# Patient Record
Sex: MALE | Race: WHITE | Employment: OTHER | URBAN - METROPOLITAN AREA
[De-identification: names, ages, dates, MRNs, and addresses within clinical notes are randomized per-mention and may not be internally consistent; named-entity substitution may affect disease eponyms.]

---

## 2017-01-09 ENCOUNTER — TELEPHONE (OUTPATIENT)
Dept: FAMILY MEDICINE CLINIC | Age: 67
End: 2017-01-09

## 2017-04-21 ENCOUNTER — OFFICE VISIT (OUTPATIENT)
Dept: BARIATRICS/WEIGHT MGMT | Age: 67
End: 2017-04-21

## 2017-04-21 VITALS
RESPIRATION RATE: 18 BRPM | WEIGHT: 300.6 LBS | DIASTOLIC BLOOD PRESSURE: 81 MMHG | TEMPERATURE: 98.7 F | HEIGHT: 70 IN | SYSTOLIC BLOOD PRESSURE: 159 MMHG | BODY MASS INDEX: 43.03 KG/M2 | HEART RATE: 83 BPM

## 2017-04-21 DIAGNOSIS — I10 ESSENTIAL HYPERTENSION: ICD-10-CM

## 2017-04-21 DIAGNOSIS — E66.9 OBESITY, UNSPECIFIED OBESITY SEVERITY, UNSPECIFIED OBESITY TYPE: Primary | ICD-10-CM

## 2017-04-21 DIAGNOSIS — E66.01 MORBID OBESITY DUE TO EXCESS CALORIES (HCC): Primary | ICD-10-CM

## 2017-04-21 DIAGNOSIS — E11.9 TYPE 2 DIABETES MELLITUS WITHOUT COMPLICATION, UNSPECIFIED LONG TERM INSULIN USE STATUS: ICD-10-CM

## 2017-04-21 DIAGNOSIS — E78.00 HYPERCHOLESTEREMIA: ICD-10-CM

## 2017-04-21 PROCEDURE — 99214 OFFICE O/P EST MOD 30 MIN: CPT | Performed by: SURGERY

## 2017-04-21 ASSESSMENT — ENCOUNTER SYMPTOMS
SHORTNESS OF BREATH: 0
EYES NEGATIVE: 1
APNEA: 1
GASTROINTESTINAL NEGATIVE: 1
ALLERGIC/IMMUNOLOGIC NEGATIVE: 1

## 2017-04-25 ENCOUNTER — OFFICE VISIT (OUTPATIENT)
Dept: FAMILY MEDICINE CLINIC | Age: 67
End: 2017-04-25

## 2017-04-25 VITALS
OXYGEN SATURATION: 96 % | SYSTOLIC BLOOD PRESSURE: 122 MMHG | RESPIRATION RATE: 20 BRPM | BODY MASS INDEX: 45.92 KG/M2 | DIASTOLIC BLOOD PRESSURE: 84 MMHG | HEART RATE: 86 BPM | WEIGHT: 303 LBS | HEIGHT: 68 IN

## 2017-04-25 DIAGNOSIS — I25.10 CORONARY ARTERY DISEASE INVOLVING NATIVE CORONARY ARTERY OF NATIVE HEART WITHOUT ANGINA PECTORIS: ICD-10-CM

## 2017-04-25 DIAGNOSIS — E78.5 HYPERLIPIDEMIA, UNSPECIFIED HYPERLIPIDEMIA TYPE: ICD-10-CM

## 2017-04-25 DIAGNOSIS — I10 ESSENTIAL HYPERTENSION: ICD-10-CM

## 2017-04-25 DIAGNOSIS — G47.33 OBSTRUCTIVE SLEEP APNEA ON CPAP: ICD-10-CM

## 2017-04-25 DIAGNOSIS — N40.1 BENIGN NON-NODULAR PROSTATIC HYPERPLASIA WITH LOWER URINARY TRACT SYMPTOMS: ICD-10-CM

## 2017-04-25 DIAGNOSIS — Z12.5 SCREENING FOR PROSTATE CANCER: ICD-10-CM

## 2017-04-25 DIAGNOSIS — E66.01 MORBID OBESITY, UNSPECIFIED OBESITY TYPE (HCC): ICD-10-CM

## 2017-04-25 DIAGNOSIS — Z99.89 OBSTRUCTIVE SLEEP APNEA ON CPAP: ICD-10-CM

## 2017-04-25 PROCEDURE — 3045F PR MOST RECENT HEMOGLOBIN A1C LEVEL 7.0-9.0%: CPT | Performed by: FAMILY MEDICINE

## 2017-04-25 PROCEDURE — G8417 CALC BMI ABV UP PARAM F/U: HCPCS | Performed by: FAMILY MEDICINE

## 2017-04-25 PROCEDURE — 3017F COLORECTAL CA SCREEN DOC REV: CPT | Performed by: FAMILY MEDICINE

## 2017-04-25 PROCEDURE — 4040F PNEUMOC VAC/ADMIN/RCVD: CPT | Performed by: FAMILY MEDICINE

## 2017-04-25 PROCEDURE — 1123F ACP DISCUSS/DSCN MKR DOCD: CPT | Performed by: FAMILY MEDICINE

## 2017-04-25 PROCEDURE — G8427 DOCREV CUR MEDS BY ELIG CLIN: HCPCS | Performed by: FAMILY MEDICINE

## 2017-04-25 PROCEDURE — 1036F TOBACCO NON-USER: CPT | Performed by: FAMILY MEDICINE

## 2017-04-25 PROCEDURE — 99214 OFFICE O/P EST MOD 30 MIN: CPT | Performed by: FAMILY MEDICINE

## 2017-04-25 PROCEDURE — G8598 ASA/ANTIPLAT THER USED: HCPCS | Performed by: FAMILY MEDICINE

## 2017-04-25 RX ORDER — VALSARTAN AND HYDROCHLOROTHIAZIDE 160; 25 MG/1; MG/1
1 TABLET ORAL DAILY
Qty: 90 TABLET | Refills: 3 | Status: SHIPPED | OUTPATIENT
Start: 2017-04-25 | End: 2019-06-12

## 2017-04-25 RX ORDER — CLOPIDOGREL BISULFATE 75 MG/1
75 TABLET ORAL DAILY
Qty: 90 TABLET | Refills: 3 | Status: SHIPPED | OUTPATIENT
Start: 2017-04-25 | End: 2020-09-01

## 2017-04-25 RX ORDER — NITROGLYCERIN 0.4 MG/1
0.4 TABLET SUBLINGUAL EVERY 5 MIN PRN
Qty: 25 TABLET | Refills: 0 | Status: SHIPPED | OUTPATIENT
Start: 2017-04-25 | End: 2017-05-28 | Stop reason: SDUPTHER

## 2017-04-25 RX ORDER — PRAVASTATIN SODIUM 10 MG
10 TABLET ORAL DAILY
Qty: 90 TABLET | Refills: 3 | Status: SHIPPED | OUTPATIENT
Start: 2017-04-25 | End: 2018-06-06 | Stop reason: SDUPTHER

## 2017-04-25 RX ORDER — GLIMEPIRIDE 2 MG/1
TABLET ORAL
Qty: 180 TABLET | Refills: 3 | Status: SHIPPED | OUTPATIENT
Start: 2017-04-25 | End: 2018-04-09 | Stop reason: SDUPTHER

## 2017-04-25 RX ORDER — TAMSULOSIN HYDROCHLORIDE 0.4 MG/1
0.4 CAPSULE ORAL DAILY
Qty: 90 CAPSULE | Refills: 3 | Status: SHIPPED | OUTPATIENT
Start: 2017-04-25 | End: 2017-09-21 | Stop reason: SDUPTHER

## 2017-04-25 RX ORDER — SITAGLIPTIN AND METFORMIN HYDROCHLORIDE 1000; 50 MG/1; MG/1
1 TABLET, FILM COATED ORAL 2 TIMES DAILY WITH MEALS
Qty: 180 TABLET | Refills: 3 | Status: SHIPPED | OUTPATIENT
Start: 2017-04-25 | End: 2018-04-09 | Stop reason: SDUPTHER

## 2017-04-25 RX ORDER — METOPROLOL SUCCINATE 50 MG/1
50 TABLET, EXTENDED RELEASE ORAL DAILY
Qty: 90 TABLET | Refills: 3 | Status: SHIPPED | OUTPATIENT
Start: 2017-04-25 | End: 2019-08-26 | Stop reason: SDUPTHER

## 2017-04-25 ASSESSMENT — ENCOUNTER SYMPTOMS
GASTROINTESTINAL NEGATIVE: 1
RESPIRATORY NEGATIVE: 1

## 2017-05-15 ENCOUNTER — OFFICE VISIT (OUTPATIENT)
Dept: BARIATRICS/WEIGHT MGMT | Age: 67
End: 2017-05-15

## 2017-05-15 VITALS — WEIGHT: 298.6 LBS | HEIGHT: 70 IN | BODY MASS INDEX: 42.75 KG/M2

## 2017-05-15 VITALS
HEIGHT: 70 IN | DIASTOLIC BLOOD PRESSURE: 74 MMHG | TEMPERATURE: 97.9 F | BODY MASS INDEX: 42.66 KG/M2 | SYSTOLIC BLOOD PRESSURE: 118 MMHG | HEART RATE: 64 BPM | WEIGHT: 298 LBS

## 2017-05-15 DIAGNOSIS — Z99.89 OSA ON CPAP: ICD-10-CM

## 2017-05-15 DIAGNOSIS — E66.01 MORBID OBESITY WITH BMI OF 40.0-44.9, ADULT (HCC): Primary | ICD-10-CM

## 2017-05-15 DIAGNOSIS — I25.83 CORONARY ARTERY DISEASE DUE TO LIPID RICH PLAQUE: ICD-10-CM

## 2017-05-15 DIAGNOSIS — G47.33 OSA ON CPAP: ICD-10-CM

## 2017-05-15 DIAGNOSIS — I25.10 CORONARY ARTERY DISEASE DUE TO LIPID RICH PLAQUE: ICD-10-CM

## 2017-05-15 DIAGNOSIS — E66.9 OBESITY, UNSPECIFIED OBESITY SEVERITY, UNSPECIFIED OBESITY TYPE: Primary | ICD-10-CM

## 2017-05-15 DIAGNOSIS — I10 ESSENTIAL HYPERTENSION: ICD-10-CM

## 2017-05-15 DIAGNOSIS — E11.9 DIABETES MELLITUS TYPE II, NON INSULIN DEPENDENT (HCC): ICD-10-CM

## 2017-05-15 PROCEDURE — G8427 DOCREV CUR MEDS BY ELIG CLIN: HCPCS | Performed by: PHYSICIAN ASSISTANT

## 2017-05-15 PROCEDURE — G8598 ASA/ANTIPLAT THER USED: HCPCS | Performed by: PHYSICIAN ASSISTANT

## 2017-05-15 PROCEDURE — 1036F TOBACCO NON-USER: CPT | Performed by: PHYSICIAN ASSISTANT

## 2017-05-15 PROCEDURE — 1123F ACP DISCUSS/DSCN MKR DOCD: CPT | Performed by: PHYSICIAN ASSISTANT

## 2017-05-15 PROCEDURE — 4040F PNEUMOC VAC/ADMIN/RCVD: CPT | Performed by: PHYSICIAN ASSISTANT

## 2017-05-15 PROCEDURE — 3045F PR MOST RECENT HEMOGLOBIN A1C LEVEL 7.0-9.0%: CPT | Performed by: PHYSICIAN ASSISTANT

## 2017-05-15 PROCEDURE — 3017F COLORECTAL CA SCREEN DOC REV: CPT | Performed by: PHYSICIAN ASSISTANT

## 2017-05-15 PROCEDURE — G8417 CALC BMI ABV UP PARAM F/U: HCPCS | Performed by: PHYSICIAN ASSISTANT

## 2017-05-15 PROCEDURE — 99213 OFFICE O/P EST LOW 20 MIN: CPT | Performed by: PHYSICIAN ASSISTANT

## 2017-05-28 DIAGNOSIS — I25.10 CORONARY ARTERY DISEASE INVOLVING NATIVE CORONARY ARTERY OF NATIVE HEART WITHOUT ANGINA PECTORIS: ICD-10-CM

## 2017-05-30 RX ORDER — NITROGLYCERIN 0.4 MG/1
TABLET SUBLINGUAL
Qty: 25 TABLET | Refills: 0 | Status: SHIPPED | OUTPATIENT
Start: 2017-05-30 | End: 2017-06-18 | Stop reason: SDUPTHER

## 2017-05-30 RX ORDER — VALSARTAN AND HYDROCHLOROTHIAZIDE 160; 25 MG/1; MG/1
TABLET ORAL
Qty: 90 TABLET | Refills: 3 | Status: SHIPPED | OUTPATIENT
Start: 2017-05-30 | End: 2017-10-19

## 2017-06-03 DIAGNOSIS — N40.1 BENIGN NON-NODULAR PROSTATIC HYPERPLASIA WITH LOWER URINARY TRACT SYMPTOMS: ICD-10-CM

## 2017-06-05 RX ORDER — TAMSULOSIN HYDROCHLORIDE 0.4 MG/1
CAPSULE ORAL
Qty: 90 CAPSULE | Refills: 3 | Status: SHIPPED | OUTPATIENT
Start: 2017-06-05 | End: 2020-06-29

## 2017-06-18 DIAGNOSIS — I25.10 CORONARY ARTERY DISEASE INVOLVING NATIVE CORONARY ARTERY OF NATIVE HEART WITHOUT ANGINA PECTORIS: ICD-10-CM

## 2017-06-19 RX ORDER — NITROGLYCERIN 0.4 MG/1
TABLET SUBLINGUAL
Qty: 25 TABLET | Refills: 0 | Status: SHIPPED | OUTPATIENT
Start: 2017-06-19 | End: 2017-07-18 | Stop reason: SDUPTHER

## 2017-06-24 DIAGNOSIS — I25.10 CORONARY ARTERY DISEASE INVOLVING NATIVE CORONARY ARTERY OF NATIVE HEART WITHOUT ANGINA PECTORIS: ICD-10-CM

## 2017-06-26 RX ORDER — METOPROLOL SUCCINATE 50 MG/1
TABLET, EXTENDED RELEASE ORAL
Qty: 90 TABLET | Refills: 3 | Status: SHIPPED | OUTPATIENT
Start: 2017-06-26 | End: 2017-10-19

## 2017-07-18 ENCOUNTER — TELEPHONE (OUTPATIENT)
Dept: FAMILY MEDICINE CLINIC | Age: 67
End: 2017-07-18

## 2017-07-18 DIAGNOSIS — I25.10 CORONARY ARTERY DISEASE INVOLVING NATIVE CORONARY ARTERY OF NATIVE HEART WITHOUT ANGINA PECTORIS: ICD-10-CM

## 2017-07-18 RX ORDER — NITROGLYCERIN 0.4 MG/1
TABLET SUBLINGUAL
Qty: 25 TABLET | Refills: 0 | Status: SHIPPED | OUTPATIENT
Start: 2017-07-18 | End: 2017-08-10 | Stop reason: SDUPTHER

## 2017-07-27 DIAGNOSIS — I25.10 CORONARY ARTERY DISEASE INVOLVING NATIVE CORONARY ARTERY OF NATIVE HEART WITHOUT ANGINA PECTORIS: ICD-10-CM

## 2017-07-27 RX ORDER — CLOPIDOGREL BISULFATE 75 MG/1
TABLET ORAL
Qty: 90 TABLET | Refills: 3 | Status: SHIPPED | OUTPATIENT
Start: 2017-07-27 | End: 2017-10-19

## 2017-08-10 DIAGNOSIS — I25.10 CORONARY ARTERY DISEASE INVOLVING NATIVE CORONARY ARTERY OF NATIVE HEART WITHOUT ANGINA PECTORIS: ICD-10-CM

## 2017-08-10 RX ORDER — NITROGLYCERIN 0.4 MG/1
TABLET SUBLINGUAL
Qty: 25 TABLET | Refills: 0 | Status: SHIPPED | OUTPATIENT
Start: 2017-08-10 | End: 2017-09-05 | Stop reason: SDUPTHER

## 2017-09-05 DIAGNOSIS — I25.10 CORONARY ARTERY DISEASE INVOLVING NATIVE CORONARY ARTERY OF NATIVE HEART WITHOUT ANGINA PECTORIS: ICD-10-CM

## 2017-09-05 RX ORDER — NITROGLYCERIN 0.4 MG/1
TABLET SUBLINGUAL
Qty: 25 TABLET | Refills: 0 | Status: SHIPPED | OUTPATIENT
Start: 2017-09-05 | End: 2017-09-26 | Stop reason: SDUPTHER

## 2017-09-19 ENCOUNTER — TELEPHONE (OUTPATIENT)
Dept: FAMILY MEDICINE CLINIC | Age: 67
End: 2017-09-19

## 2017-09-21 ENCOUNTER — CARE COORDINATION (OUTPATIENT)
Dept: CARE COORDINATION | Age: 67
End: 2017-09-21

## 2017-09-26 DIAGNOSIS — I25.10 CORONARY ARTERY DISEASE INVOLVING NATIVE CORONARY ARTERY OF NATIVE HEART WITHOUT ANGINA PECTORIS: ICD-10-CM

## 2017-09-26 RX ORDER — NITROGLYCERIN 0.4 MG/1
TABLET SUBLINGUAL
Qty: 25 TABLET | Refills: 0 | Status: SHIPPED | OUTPATIENT
Start: 2017-09-26 | End: 2017-10-22 | Stop reason: SDUPTHER

## 2017-10-10 ENCOUNTER — TELEPHONE (OUTPATIENT)
Dept: FAMILY MEDICINE CLINIC | Age: 67
End: 2017-10-10

## 2017-10-12 LAB
ALBUMIN SERPL-MCNC: 4.5 G/DL (ref 3.2–5.3)
ALK PHOSPHATASE: 53 IU/L (ref 35–121)
ALT SERPL-CCNC: 34 IU/L (ref 5–59)
ANION GAP SERPL CALCULATED.3IONS-SCNC: 14 MMOL/L
AST SERPL-CCNC: 21 IU/L (ref 10–42)
AVERAGE GLUCOSE: 186 MG/DL (ref 66–114)
BILIRUB SERPL-MCNC: 0.5 MG/DL (ref 0.2–1.3)
BUN BLDV-MCNC: 16 MG/DL (ref 10–20)
CALCIUM SERPL-MCNC: 9.6 MG/DL (ref 8.7–10.8)
CHLORIDE BLD-SCNC: 101 MMOL/L (ref 95–111)
CHOLESTEROL/HDL RATIO: 5.1
CHOLESTEROL: 157 MG/DL
CO2: 24 MMOL/L (ref 21–32)
CREAT SERPL-MCNC: 0.9 MG/DL (ref 0.5–1.3)
CREATINE, URINE: 155.6 MG/DL
EGFR AFRICAN AMERICAN: 102
EGFR IF NONAFRICAN AMERICAN: 84
GLUCOSE: 225 MG/DL (ref 70–100)
HBA1C MFR BLD: 8.1 % (ref 4.2–5.8)
HDLC SERPL-MCNC: 31 MG/DL (ref 40–60)
MICROALBUMIN/CREAT 24H UR: 2.3 MG/DL
MICROALBUMIN/CREAT UR-RTO: 15 MCG/MG
POTASSIUM SERPL-SCNC: 3.8 MMOL/L (ref 3.5–5.4)
PSA, ULTRASENSITIVE: 1.86 NG/ML
SODIUM BLD-SCNC: 135 MMOL/L (ref 134–147)
TOTAL PROTEIN: 7.1 G/DL (ref 5.8–8)
TRIGL SERPL-MCNC: 450 MG/DL
TSH SERPL DL<=0.05 MIU/L-ACNC: 1.47 UIU/ML (ref 0.4–4.4)

## 2017-10-16 ENCOUNTER — CARE COORDINATION (OUTPATIENT)
Dept: CARE COORDINATION | Age: 67
End: 2017-10-16

## 2017-10-19 ENCOUNTER — OFFICE VISIT (OUTPATIENT)
Dept: FAMILY MEDICINE CLINIC | Age: 67
End: 2017-10-19
Payer: MEDICARE

## 2017-10-19 VITALS
RESPIRATION RATE: 16 BRPM | BODY MASS INDEX: 44.14 KG/M2 | HEIGHT: 69 IN | WEIGHT: 298 LBS | SYSTOLIC BLOOD PRESSURE: 136 MMHG | HEART RATE: 70 BPM | OXYGEN SATURATION: 95 % | DIASTOLIC BLOOD PRESSURE: 70 MMHG

## 2017-10-19 DIAGNOSIS — E66.01 MORBID OBESITY WITH BMI OF 40.0-44.9, ADULT (HCC): ICD-10-CM

## 2017-10-19 DIAGNOSIS — I25.10 CORONARY ARTERY DISEASE INVOLVING NATIVE CORONARY ARTERY OF NATIVE HEART WITHOUT ANGINA PECTORIS: ICD-10-CM

## 2017-10-19 DIAGNOSIS — E78.49 OTHER HYPERLIPIDEMIA: ICD-10-CM

## 2017-10-19 DIAGNOSIS — K51.90 ULCERATIVE COLITIS WITHOUT COMPLICATIONS, UNSPECIFIED LOCATION (HCC): ICD-10-CM

## 2017-10-19 DIAGNOSIS — G47.33 OBSTRUCTIVE SLEEP APNEA ON CPAP: ICD-10-CM

## 2017-10-19 DIAGNOSIS — E78.5 HYPERLIPIDEMIA, UNSPECIFIED HYPERLIPIDEMIA TYPE: ICD-10-CM

## 2017-10-19 DIAGNOSIS — I10 ESSENTIAL HYPERTENSION: ICD-10-CM

## 2017-10-19 DIAGNOSIS — Z99.89 OBSTRUCTIVE SLEEP APNEA ON CPAP: ICD-10-CM

## 2017-10-19 PROCEDURE — 4004F PT TOBACCO SCREEN RCVD TLK: CPT | Performed by: FAMILY MEDICINE

## 2017-10-19 PROCEDURE — G8417 CALC BMI ABV UP PARAM F/U: HCPCS | Performed by: FAMILY MEDICINE

## 2017-10-19 PROCEDURE — 3017F COLORECTAL CA SCREEN DOC REV: CPT | Performed by: FAMILY MEDICINE

## 2017-10-19 PROCEDURE — G8598 ASA/ANTIPLAT THER USED: HCPCS | Performed by: FAMILY MEDICINE

## 2017-10-19 PROCEDURE — 3046F HEMOGLOBIN A1C LEVEL >9.0%: CPT | Performed by: FAMILY MEDICINE

## 2017-10-19 PROCEDURE — 4040F PNEUMOC VAC/ADMIN/RCVD: CPT | Performed by: FAMILY MEDICINE

## 2017-10-19 PROCEDURE — G8484 FLU IMMUNIZE NO ADMIN: HCPCS | Performed by: FAMILY MEDICINE

## 2017-10-19 PROCEDURE — 1123F ACP DISCUSS/DSCN MKR DOCD: CPT | Performed by: FAMILY MEDICINE

## 2017-10-19 PROCEDURE — 99214 OFFICE O/P EST MOD 30 MIN: CPT | Performed by: FAMILY MEDICINE

## 2017-10-19 PROCEDURE — G8427 DOCREV CUR MEDS BY ELIG CLIN: HCPCS | Performed by: FAMILY MEDICINE

## 2017-10-19 ASSESSMENT — PATIENT HEALTH QUESTIONNAIRE - PHQ9
2. FEELING DOWN, DEPRESSED OR HOPELESS: 0
SUM OF ALL RESPONSES TO PHQ9 QUESTIONS 1 & 2: 0
1. LITTLE INTEREST OR PLEASURE IN DOING THINGS: 0
SUM OF ALL RESPONSES TO PHQ QUESTIONS 1-9: 0

## 2017-10-19 ASSESSMENT — ENCOUNTER SYMPTOMS
GASTROINTESTINAL NEGATIVE: 1
RESPIRATORY NEGATIVE: 1

## 2017-10-19 NOTE — PROGRESS NOTES
DISSOLVE EVERY 5 MINUTES ASNEEDED FOR CHEST PAIN 9/26/17  Yes Estefany Rather, DO   CO ENZYME Q-10 PO Take 1 capsule by mouth daily   Yes Historical Provider, MD   tamsulosin (FLOMAX) 0.4 MG capsule TAKE 1 CAPSULE AS NEEDED 6/5/17  Yes Estefany Rather, DO   clopidogrel (PLAVIX) 75 MG tablet Take 1 tablet by mouth daily 4/25/17  Yes Estefany Rather, DO   metoprolol succinate (TOPROL XL) 50 MG extended release tablet Take 1 tablet by mouth daily 4/25/17  Yes Estefany Rather, DO   JANUMET  MG per tablet Take 1 tablet by mouth 2 times daily (with meals) 4/25/17  Yes Estefany Rather, DO   valsartan-hydrochlorothiazide (DIOVAN HCT) 160-25 MG per tablet Take 1 tablet by mouth daily 4/25/17 4/25/18 Yes Estefany Rather, DO   pravastatin (PRAVACHOL) 10 MG tablet Take 1 tablet by mouth daily 4/25/17  Yes Estefany Rather, DO   RELION GLUCOSE TEST STRIPS strip Check sugars 1-2 times daily. Dx: 250.02 9/26/14  Yes Estefany Rather, DO   ReliOn Ultra Thin Lancets MISC Check sugars 1-2 times daily. Dx: 250.02 9/26/14  Yes Estefany Rather, DO   Blood Glucose Monitoring Suppl (RELION CONFIRM GLUCOSE MONITOR) W/DEVICE KIT Relion monitor. Dx:  250.02 2/13/14  Yes Estefany Rather, DO   aspirin 81 MG chewable tablet Take 1 tablet by mouth daily.  7/2/13  Yes Estefany Rather, DO   glimepiride (AMARYL) 2 MG tablet TAKE ONE TABLET BY MOUTH TWICE DAILY 4/25/17   Estefany Rather, DO       Lab Results   Component Value Date    LABA1C 8.1 (H) 10/11/2017     No results found for: EAG    No components found for: CHLPL  Lab Results   Component Value Date    TRIG 450 (H) 10/11/2017    TRIG 584 (H) 07/14/2016    TRIG 395 (H) 04/15/2016     Lab Results   Component Value Date    HDL 31 (L) 10/11/2017    HDL 28 (L) 07/14/2016    HDL 29 (L) 04/15/2016     Lab Results   Component Value Date    LDLCALC 36 04/15/2016    LDLCALC 66 07/08/2014    LDLCALC 46 01/09/2014     Lab Results   Component Value

## 2017-10-22 DIAGNOSIS — I25.10 CORONARY ARTERY DISEASE INVOLVING NATIVE CORONARY ARTERY OF NATIVE HEART WITHOUT ANGINA PECTORIS: ICD-10-CM

## 2017-10-23 RX ORDER — NITROGLYCERIN 0.4 MG/1
TABLET SUBLINGUAL
Qty: 25 TABLET | Refills: 0 | Status: SHIPPED | OUTPATIENT
Start: 2017-10-23 | End: 2017-12-10 | Stop reason: SDUPTHER

## 2017-11-03 ENCOUNTER — CARE COORDINATION (OUTPATIENT)
Dept: CARE COORDINATION | Age: 67
End: 2017-11-03

## 2017-11-03 ASSESSMENT — ENCOUNTER SYMPTOMS: DYSPNEA ASSOCIATED WITH: EXERTION

## 2017-11-03 NOTE — CARE COORDINATION
Ambulatory Care Coordination Note  11/3/2017  CM Risk Score: 6  Sky Mortality Risk Score: 3.55    ACC: Althea Meigs, RN    Summary Note: spoke with Darlene Calero today. He states that he is doing well. Pt recently seen by PCP. Most recent A1C 8.1. Pt states that he knows he needs to take action regarding his health and is working on making better lifestyle choices to help achieve this. Pt joined the [employer] 2-3 wks ago. He is currently swimming 3 days per week for 30 min each time. Pt also reports that he utilizes sauna and whirlpool while there. Pt hopes to further increase his activity and eventually work up to using workout equip. Pt also reports that he is reading a guide book on managing his Diabetes and has taken dietary tips from that. He has reduced his carb intake drastically. I did stress importance of him not cutting out carbs completely. Discussed how carbs are essential and they help to provide \"fuel\" for our bodies. Verbalizes understanding. Pt has now been checking his BS routinely. States he is checking 2-3 times per day. Reports that he has seen his BS's improve. Denies having any episodes of hypoglycemia. Reviewed s/s of hypoglycemia and ways to treat. Pt reports that he has lost appx 9-10 pounds in last 2 wks. Current wt by his scales in 289 pounds. Pt states that he has also been working on reducing portion sizes. He purchased food scale 3-4 days ago and is measuring out his food. Reviewed diabetes and COPD zone mgmt. Tool sheets mailed to pt. Pt reports that he is getting over a cold. States that he is feeling better now. Reviewed importance of early recognition and reporting of symptoms. Encouraged pt to continue exercising and ongoing work with his diet. Congratulated him on his efforts thus far. Pt states that overall he has already noticed his energy level increased. Encouraged to call with any new questions or concerns.     Diabetes Assessment    Medic Alert ID: No  Meal Planning:  Avoidance of concentrated sweets, Other   How often do you test your blood sugar?:  Other (Comment: checking 2-3 times per day)   Do you have barriers with adherence to non-pharmacologic self-management interventions? (Nutrition/Exercise/Self-Monitoring):  Yes   Have you ever had to go to the ED for symptoms of low blood sugar?:  No       Do you have hyperglycemia symptoms?:  No   Do you have hypoglycemia symptoms?:  No   Blood Sugar Trends:  Steady Decrease       and   COPD Assessment    Does the patient understand envrionmental exposure?:  Yes  Does the patient have a nebulizer?:  No            Symptoms:   None:  Yes      Symptom course:  stable  Breathlessness:  exertion  Increase use of rapid acting/rescue inhaled medications?:  Not Applicable  Change in chronic cough?:  Increased  Change in sputum?:  No/At Baseline  Sputum characteristics:  Clear  Self Monitoring - SaO2:  No  Have you had a recent diagnosis of pneumonia either by PCP or at a hospital?:  No               Care Coordination Interventions    Program Enrollment:  Complex Care  Referral from Primary Care Provider:  No  Suggested Interventions and Community Resources  Zone Management Tools:  Completed (Comment: reviewed Diabetes and COPD zone mgmt. mailed to pt. )         Goals Addressed             Most Recent     Conditions and Symptoms   On track (11/3/2017)             I will schedule office visits, as directed by my provider. I will keep my appointment or reschedule if I have to cancel. I will notify my provider of any barriers to my plan of care. I will follow my Zone Management tool to seek urgent or emergent care. I will notify my provider of any symptoms that indicate a worsening of my condition. Barriers: time constraints  Plan for overcoming my barriers: use calendar/planner and schedule ahead if possible.    Confidence: 6/10  Anticipated Goal Completion Date: 12/22/17         Exercise 3x per week (30 min per W/DEVICE KIT Relion monitor.   Dx:  250.02 2/13/14   Malachi [place], DO       Future Appointments  Date Time Provider R[address]   1/18/2018 1:45 PM Malachi [place], DO [address]

## 2017-12-05 ENCOUNTER — CARE COORDINATION (OUTPATIENT)
Dept: CARE COORDINATION | Age: 67
End: 2017-12-05

## 2017-12-05 ASSESSMENT — ENCOUNTER SYMPTOMS: DYSPNEA ASSOCIATED WITH: EXERTION

## 2017-12-05 NOTE — CARE COORDINATION
Ambulatory Care Coordination Note  12/5/2017  CM Risk Score: 6  Sky Mortality Risk Score: 3.55    ACC: Trinidad Hinds, RN    Summary Note: spoke with Inés Yadav today. He states that he is doing well. Pt has been traveling for the last 3 wks so has been out of his exercise routine. States that yesterday he started back swimming at the [facility]. He plans on going 2-3x per week. Pt states that while he was traveling he and his wife did eat out, but was better about choosing healthier food options. Pt states that he did not lose any wt while out of town, but maintained current wt of 289 pounds. Pt would like to lose 10 more pounds before his PCP appt 1-18. Informed pt that this is an achievable goal if he continues to work out it. Reports BS's are improving. Denies any episodes of hypoglycemia. Pt stated that he does not always take his janumet with breakfast and will sometimes wait until lunch. I encouraged pt to take janumet every morning with breakfast and with supper daily. Pt verbalizes understanding. Reinforced importance of diet and exercise to help in improving BS's. Reviewed diabetes zone mgmt. Reports breathing is at baseline at present time. Denies any questions or concerns at this time. Encouraged to call with any new questions or concerns. Diabetes Assessment    Medic Alert ID:  No  Meal Planning:  Avoidance of concentrated sweets, Other   How often do you test your blood sugar?:  Other (Comment: checking 2-3 times per day)   Do you have barriers with adherence to non-pharmacologic self-management interventions?  (Nutrition/Exercise/Self-Monitoring):  Yes   Have you ever had to go to the ED for symptoms of low blood sugar?:  No       Do you have hyperglycemia symptoms?:  No   Do you have hypoglycemia symptoms?:  No   Last Blood Sugar Value:  111   Blood Sugar Trends:  Steady Decrease       and   COPD Assessment    Does the patient understand envrionmental exposure?:  Yes  Does the patient have a nebulizer?:  No            Symptoms:   None:  Yes      Symptom course:  stable  Breathlessness:  exertion  Increase use of rapid acting/rescue inhaled medications?:  Not Applicable  Change in chronic cough?:  No/At Baseline  Change in sputum?:  No/At Baseline  Sputum characteristics:  Clear  Self Monitoring - SaO2:  No  Have you had a recent diagnosis of pneumonia either by PCP or at a hospital?:  No               Care Coordination Interventions    Program Enrollment:  Complex Care  Referral from Primary Care Provider:  No  Suggested Interventions and Community Resources  Zone Management Tools:  Completed (Comment: reviewed Diabetes and COPD zone mgmt. mailed to pt. )         Goals Addressed             Most Recent     Conditions and Symptoms   On track (12/5/2017)             I will schedule office visits, as directed by my provider. I will keep my appointment or reschedule if I have to cancel. I will notify my provider of any barriers to my plan of care. I will follow my Zone Management tool to seek urgent or emergent care. I will notify my provider of any symptoms that indicate a worsening of my condition. Barriers: time constraints  Plan for overcoming my barriers: use calendar/planner and schedule ahead if possible. Confidence: 6/10  Anticipated Goal Completion Date: 12/22/17         Self Monitoring   On track (12/5/2017)             Self-Monitored Blood Glucose - I will check my blood sugar Fasting blood sugar    None Recently Recorded    Barriers: fear of failure  Plan for overcoming my barriers: reviewed importance of checking BS's. Get back in to routine  Confidence: 6/10  Anticipated Goal Completion Date: 12/22/17              Prior to Admission medications    Medication Sig Start Date End Date Taking?  Authorizing Provider   CO ENZYME Q-10 PO Take 1 capsule by mouth daily   Yes Historical Provider, MD   tamsulosin (FLOMAX) 0.4 MG capsule TAKE 1 CAPSULE AS NEEDED 6/5/17  Yes Alec Rey Yamileth Greenfield, DO   clopidogrel (PLAVIX) 75 MG tablet Take 1 tablet by mouth daily 4/25/17  Yes Ross Neri, DO   metoprolol succinate (TOPROL XL) 50 MG extended release tablet Take 1 tablet by mouth daily 4/25/17  Yes Ross Neri, DO   JANUMET  MG per tablet Take 1 tablet by mouth 2 times daily (with meals) 4/25/17  Yes Ross Neri DO   valsartan-hydrochlorothiazide (DIOVAN HCT) 160-25 MG per tablet Take 1 tablet by mouth daily 4/25/17 4/25/18 Yes Ross Neri, DO   glimepiride (AMARYL) 2 MG tablet TAKE ONE TABLET BY MOUTH TWICE DAILY 4/25/17  Yes Ross Neri DO   pravastatin (PRAVACHOL) 10 MG tablet Take 1 tablet by mouth daily 4/25/17  Yes Ross Neri DO   aspirin 81 MG chewable tablet Take 1 tablet by mouth daily. 7/2/13  Yes Ross Neri DO   nitroGLYCERIN (NITROSTAT) 0.4 MG SL tablet PLACE 1 TABLET UNDER THE   TONGUE AND ALLOW TO        DISSOLVE EVERY 5 MINUTES ASNEEDED FOR CHEST PAIN 10/23/17   Ross Neri, DO   RELION GLUCOSE TEST STRIPS strip Check sugars 1-2 times daily. Dx: 250.02 9/26/14   Ross Neri DO   ReliOn Ultra Thin Lancets MISC Check sugars 1-2 times daily. Dx: 250.02 9/26/14   Ross Neri DO   Blood Glucose Monitoring Suppl (RELION CONFIRM GLUCOSE MONITOR) W/DEVICE KIT Relion monitor.   Dx:  250.02 2/13/14   Ross Neri DO       Future Appointments  Date Time Provider Rupal Lane   1/18/2018 1:45 PM Ross Neri DO [address] [facility]

## 2017-12-10 DIAGNOSIS — I25.10 CORONARY ARTERY DISEASE INVOLVING NATIVE CORONARY ARTERY OF NATIVE HEART WITHOUT ANGINA PECTORIS: ICD-10-CM

## 2017-12-11 RX ORDER — NITROGLYCERIN 0.4 MG/1
TABLET SUBLINGUAL
Qty: 25 TABLET | Refills: 0 | Status: SHIPPED | OUTPATIENT
Start: 2017-12-11 | End: 2018-01-12 | Stop reason: SDUPTHER

## 2018-01-09 ENCOUNTER — TELEPHONE (OUTPATIENT)
Dept: FAMILY MEDICINE CLINIC | Age: 68
End: 2018-01-09

## 2018-01-10 ENCOUNTER — CARE COORDINATION (OUTPATIENT)
Dept: CARE COORDINATION | Age: 68
End: 2018-01-10

## 2018-01-10 ASSESSMENT — ENCOUNTER SYMPTOMS: DYSPNEA ASSOCIATED WITH: EXERTION

## 2018-01-12 DIAGNOSIS — I25.10 CORONARY ARTERY DISEASE INVOLVING NATIVE CORONARY ARTERY OF NATIVE HEART WITHOUT ANGINA PECTORIS: ICD-10-CM

## 2018-01-12 RX ORDER — NITROGLYCERIN 0.4 MG/1
TABLET SUBLINGUAL
Qty: 25 TABLET | Refills: 0 | Status: SHIPPED | OUTPATIENT
Start: 2018-01-12 | End: 2018-06-24 | Stop reason: SDUPTHER

## 2018-01-13 LAB
AVERAGE GLUCOSE: 134 MG/DL (ref 66–114)
HBA1C MFR BLD: 6.3 % (ref 4.2–5.8)

## 2018-01-18 ENCOUNTER — OFFICE VISIT (OUTPATIENT)
Dept: FAMILY MEDICINE CLINIC | Age: 68
End: 2018-01-18
Payer: MEDICARE

## 2018-01-18 VITALS
DIASTOLIC BLOOD PRESSURE: 82 MMHG | WEIGHT: 285 LBS | HEART RATE: 72 BPM | BODY MASS INDEX: 42.39 KG/M2 | RESPIRATION RATE: 16 BRPM | TEMPERATURE: 98 F | SYSTOLIC BLOOD PRESSURE: 136 MMHG

## 2018-01-18 DIAGNOSIS — E78.49 OTHER HYPERLIPIDEMIA: ICD-10-CM

## 2018-01-18 DIAGNOSIS — E11.9 CONTROLLED TYPE 2 DIABETES MELLITUS WITHOUT COMPLICATION, WITHOUT LONG-TERM CURRENT USE OF INSULIN (HCC): ICD-10-CM

## 2018-01-18 DIAGNOSIS — E66.01 MORBID OBESITY WITH BMI OF 40.0-44.9, ADULT (HCC): ICD-10-CM

## 2018-01-18 DIAGNOSIS — I10 ESSENTIAL HYPERTENSION: ICD-10-CM

## 2018-01-18 DIAGNOSIS — I25.10 CORONARY ARTERY DISEASE INVOLVING NATIVE CORONARY ARTERY OF NATIVE HEART WITHOUT ANGINA PECTORIS: ICD-10-CM

## 2018-01-18 DIAGNOSIS — N40.0 BENIGN PROSTATIC HYPERPLASIA, UNSPECIFIED WHETHER LOWER URINARY TRACT SYMPTOMS PRESENT: ICD-10-CM

## 2018-01-18 DIAGNOSIS — M19.041 OSTEOARTHRITIS OF FINGERS OF HANDS, BILATERAL: Primary | ICD-10-CM

## 2018-01-18 DIAGNOSIS — E66.01 MORBID OBESITY (HCC): ICD-10-CM

## 2018-01-18 DIAGNOSIS — M19.042 OSTEOARTHRITIS OF FINGERS OF HANDS, BILATERAL: Primary | ICD-10-CM

## 2018-01-18 DIAGNOSIS — K51.90 ULCERATIVE COLITIS WITHOUT COMPLICATIONS, UNSPECIFIED LOCATION (HCC): ICD-10-CM

## 2018-01-18 PROCEDURE — 4004F PT TOBACCO SCREEN RCVD TLK: CPT | Performed by: FAMILY MEDICINE

## 2018-01-18 PROCEDURE — G8598 ASA/ANTIPLAT THER USED: HCPCS | Performed by: FAMILY MEDICINE

## 2018-01-18 PROCEDURE — 4040F PNEUMOC VAC/ADMIN/RCVD: CPT | Performed by: FAMILY MEDICINE

## 2018-01-18 PROCEDURE — 3044F HG A1C LEVEL LT 7.0%: CPT | Performed by: FAMILY MEDICINE

## 2018-01-18 PROCEDURE — G8427 DOCREV CUR MEDS BY ELIG CLIN: HCPCS | Performed by: FAMILY MEDICINE

## 2018-01-18 PROCEDURE — 1123F ACP DISCUSS/DSCN MKR DOCD: CPT | Performed by: FAMILY MEDICINE

## 2018-01-18 PROCEDURE — 99214 OFFICE O/P EST MOD 30 MIN: CPT | Performed by: FAMILY MEDICINE

## 2018-01-18 PROCEDURE — G8484 FLU IMMUNIZE NO ADMIN: HCPCS | Performed by: FAMILY MEDICINE

## 2018-01-18 PROCEDURE — G8417 CALC BMI ABV UP PARAM F/U: HCPCS | Performed by: FAMILY MEDICINE

## 2018-01-18 PROCEDURE — 3017F COLORECTAL CA SCREEN DOC REV: CPT | Performed by: FAMILY MEDICINE

## 2018-01-18 RX ORDER — CELECOXIB 200 MG/1
200 CAPSULE ORAL DAILY
Qty: 90 CAPSULE | Refills: 3 | Status: SHIPPED | OUTPATIENT
Start: 2018-01-18 | End: 2019-02-01 | Stop reason: SDUPTHER

## 2018-01-18 ASSESSMENT — ENCOUNTER SYMPTOMS
GASTROINTESTINAL NEGATIVE: 1
RESPIRATORY NEGATIVE: 1

## 2018-01-18 NOTE — PROGRESS NOTES
Subjective:      Patient ID: Norah Gore is a 75 y.o. male. HPI:    Chief Complaint   Patient presents with    Diabetes     3 month F/U DM. Discuss blood work results.  Hand Pain     C/o bilateral hand pain and swelling x 4 months. Notes trouble bending fingers. 3 month eval.  Doing well overall. Has been fighting some hand pain and swelling for the last 4 mos. Hands are stiff, hard for them to close the hands. Sugars well controlled and much improved. Down from 8.1. Lab Results   Component Value Date    LABA1C 6.3 (H) 01/12/2018     Weight is down 13 lbs from last visit. Eating much better and exercising more. Wt Readings from Last 3 Encounters:   01/18/18 285 lb (129.3 kg)   10/19/17 298 lb (135.2 kg)   05/15/17 298 lb (135.2 kg)     BP very well controlled. BP Readings from Last 3 Encounters:   01/18/18 136/82   10/19/17 136/70   05/15/17 118/74     Pt denies CP, SOB, chest tightness.     Patient Active Problem List   Diagnosis    Hyperlipidemia    HTN (hypertension)    Morbid obesity ([address]    CAD (coronary artery disease)    OA (osteoarthritis)    Ulcerative colitis ([address]    Diabetes mellitus, type II ([address]    Gout    Morbid obesity with BMI of 40.0-44.9, adult ([address]    Diabetes mellitus type II, uncontrolled ([address]    Leukocytosis    Lower urinary tract infectious disease    Acute pyelonephritis    Prostatitis    Lower urinary tract symptoms (LUTS)    BPH (benign prostatic hyperplasia)    Diabetes ([address]    Hyperlipemia    Obstructive sleep apnea on CPAP    Uncontrolled type 2 diabetes mellitus without complication, without long-term current use of insulin ([address]     Past Surgical History:   Procedure Laterality Date    CORONARY ANGIOPLASTY      CORONARY ANGIOPLASTY WITH STENT PLACEMENT  2004    stents x 2    DIAGNOSTIC CARDIAC CATH LAB PROCEDURE      HERNIA REPAIR  1966    JOINT REPLACEMENT Right 2009    rotator cuff    NASAL SINUS SURGERY  2007 Prior to Admission medications    Medication Sig Start Date End Date Taking? Authorizing Provider   nitroGLYCERIN (NITROSTAT) 0.4 MG SL tablet PLACE 1 TABLET UNDER THE   TONGUE AND ALLOW TO        DISSOLVE EVERY 5 MINUTES ASNEEDED FOR CHEST PAIN 1/12/18  Yes Vannessa Rinaldi, DO   CO ENZYME Q-10 PO Take 1 capsule by mouth daily   Yes Historical Provider, MD   tamsulosin (FLOMAX) 0.4 MG capsule TAKE 1 CAPSULE AS NEEDED 6/5/17  Yes Vannessa Rinaldi, DO   clopidogrel (PLAVIX) 75 MG tablet Take 1 tablet by mouth daily 4/25/17  Yes Vannessa Rinaldi, DO   metoprolol succinate (TOPROL XL) 50 MG extended release tablet Take 1 tablet by mouth daily 4/25/17  Yes Vannessa Rinaldi, DO   JANUMET  MG per tablet Take 1 tablet by mouth 2 times daily (with meals) 4/25/17  Yes Vannessa Rinaldi, DO   valsartan-hydrochlorothiazide (DIOVAN HCT) 160-25 MG per tablet Take 1 tablet by mouth daily 4/25/17 4/25/18 Yes Vannessa Rinaldi, DO   glimepiride (AMARYL) 2 MG tablet TAKE ONE TABLET BY MOUTH TWICE DAILY 4/25/17  Yes Vannessa Rinaldi, DO   pravastatin (PRAVACHOL) 10 MG tablet Take 1 tablet by mouth daily 4/25/17  Yes Vannessa Rinaldi, DO   RELION GLUCOSE TEST STRIPS strip Check sugars 1-2 times daily. Dx: 250.02 9/26/14  Yes Vannessa Rinaldi, DO   ReliOn Ultra Thin Lancets MISC Check sugars 1-2 times daily. Dx: 250.02 9/26/14  Yes Vannessa Rinaldi, DO   Blood Glucose Monitoring Suppl (RELION CONFIRM GLUCOSE MONITOR) W/DEVICE KIT Relion monitor. Dx:  250.02 2/13/14  Yes Vannessa Rinaldi, DO   aspirin 81 MG chewable tablet Take 1 tablet by mouth daily.  7/2/13  Yes Vannessa Rinaldi, DO       Lab Results   Component Value Date    LABA1C 6.3 (H) 01/12/2018     No results found for: EAG    No components found for: CHLPL  Lab Results   Component Value Date    TRIG 450 (H) 10/11/2017    TRIG 584 (H) 07/14/2016    TRIG 395 (H) 04/15/2016     Lab Results   Component Value Date    HDL 31 (L)

## 2018-02-14 ENCOUNTER — CARE COORDINATION (OUTPATIENT)
Dept: CARE COORDINATION | Age: 68
End: 2018-02-14

## 2018-03-19 ENCOUNTER — CARE COORDINATION (OUTPATIENT)
Dept: CARE COORDINATION | Age: 68
End: 2018-03-19

## 2018-04-09 RX ORDER — GLIMEPIRIDE 2 MG/1
TABLET ORAL
Qty: 180 TABLET | Refills: 3 | Status: SHIPPED | OUTPATIENT
Start: 2018-04-09 | End: 2019-03-07 | Stop reason: SDUPTHER

## 2018-04-09 RX ORDER — SITAGLIPTIN AND METFORMIN HYDROCHLORIDE 1000; 50 MG/1; MG/1
TABLET, FILM COATED ORAL
Qty: 180 TABLET | Refills: 3 | Status: SHIPPED | OUTPATIENT
Start: 2018-04-09 | End: 2019-03-07 | Stop reason: SDUPTHER

## 2018-04-18 ENCOUNTER — CARE COORDINATION (OUTPATIENT)
Dept: CARE COORDINATION | Age: 68
End: 2018-04-18

## 2018-05-21 ENCOUNTER — CARE COORDINATION (OUTPATIENT)
Dept: CARE COORDINATION | Age: 68
End: 2018-05-21

## 2018-06-06 DIAGNOSIS — E78.5 HYPERLIPIDEMIA, UNSPECIFIED HYPERLIPIDEMIA TYPE: ICD-10-CM

## 2018-06-06 RX ORDER — PRAVASTATIN SODIUM 10 MG
TABLET ORAL
Qty: 90 TABLET | Refills: 3 | Status: SHIPPED | OUTPATIENT
Start: 2018-06-06 | End: 2019-06-01 | Stop reason: SDUPTHER

## 2018-06-24 DIAGNOSIS — I25.10 CORONARY ARTERY DISEASE INVOLVING NATIVE CORONARY ARTERY OF NATIVE HEART WITHOUT ANGINA PECTORIS: ICD-10-CM

## 2018-06-25 RX ORDER — NITROGLYCERIN 0.4 MG/1
TABLET SUBLINGUAL
Qty: 25 TABLET | Refills: 0 | Status: SHIPPED | OUTPATIENT
Start: 2018-06-25 | End: 2018-07-14 | Stop reason: SDUPTHER

## 2018-07-02 ENCOUNTER — CARE COORDINATION (OUTPATIENT)
Dept: CARE COORDINATION | Age: 68
End: 2018-07-02

## 2018-07-11 ENCOUNTER — TELEPHONE (OUTPATIENT)
Dept: FAMILY MEDICINE CLINIC | Age: 68
End: 2018-07-11

## 2018-07-11 NOTE — TELEPHONE ENCOUNTER
Spoke with pt regarding his upcoming appt on 7/18/18 at 9am.  Confirmed appt and reminded pt to get labs done.   Pt had to reschedule appt, his new appt is Tuesday 8/21/18 at 9:15am.

## 2018-07-14 DIAGNOSIS — I25.10 CORONARY ARTERY DISEASE INVOLVING NATIVE CORONARY ARTERY OF NATIVE HEART WITHOUT ANGINA PECTORIS: ICD-10-CM

## 2018-07-16 RX ORDER — NITROGLYCERIN 0.4 MG/1
TABLET SUBLINGUAL
Qty: 25 TABLET | Refills: 0 | Status: SHIPPED | OUTPATIENT
Start: 2018-07-16 | End: 2018-08-06 | Stop reason: SDUPTHER

## 2018-08-06 DIAGNOSIS — I25.10 CORONARY ARTERY DISEASE INVOLVING NATIVE CORONARY ARTERY OF NATIVE HEART WITHOUT ANGINA PECTORIS: ICD-10-CM

## 2018-08-06 RX ORDER — NITROGLYCERIN 0.4 MG/1
TABLET SUBLINGUAL
Qty: 25 TABLET | Refills: 0 | Status: SHIPPED | OUTPATIENT
Start: 2018-08-06 | End: 2018-08-30 | Stop reason: SDUPTHER

## 2018-08-09 ENCOUNTER — CARE COORDINATION (OUTPATIENT)
Dept: CARE COORDINATION | Age: 68
End: 2018-08-09

## 2018-08-09 ASSESSMENT — ENCOUNTER SYMPTOMS: DYSPNEA ASSOCIATED WITH: EXERTION

## 2018-08-09 NOTE — CARE COORDINATION
Ambulatory Care Coordination Note  8/9/2018  CM Risk Score: 6  Sky Mortality Risk Score: 3.55    ACC: Hamzah Garcia, RN    Summary Note: spoke with Iram Darrick today. He reports that he has fell into bad habits again. Pt states \"I have lots of excuses. \"  Pt reports that BS's have been elevated. Has gym membership, but since coming back from vacation has only been going 1 every other week. Reports that he has been extremely busy working which has taken up most of his time. Also reports that he has been eating at home, but eating increased amt of carbs and sweets. Pt has good understanding of how to control his diet, but has difficulty with self control. Has had a lot of fasting BS's in the 200's. Discussed importance of good BS control and risk of developing complications from uncontrolled DM. Discussed importance of goal setting and making exercise a priority. Pt feels as though he can get back on track and understands the importance of doing so. Pt's goal at this point is to start working out at gym again 2-3x per week and work on improving his diet. Was to have an appt with PCP on 8/21. Requesting to change appt to Sept.  appt arranged for Sept 19th. Pt states that it would be helpful if he could obtain some diabetic friendly recipes. Informed him that I would work on obtaining some for him and mail to him. Discussed possible need for medication changes. PCP has discussed with pt in past about going on insulin. Pt very resistant to this. Reminded pt that DM is a progressive disease and ultimate goal is to get BS's under control. Pt admits that he has felt more fatigued/less energy since BS's have been higher. Reminded pt to get labs done and stressed importance of him f/u with PCP. Verbalizes understanding. Reviewed DM zone mgmt. Reviewed importance of early symptom recognition and reporting. Obtained handout for Websites for Healthy Recipes and Websites for calorie /carb counting.   Both

## 2018-08-24 ENCOUNTER — CARE COORDINATION (OUTPATIENT)
Dept: CARE COORDINATION | Age: 68
End: 2018-08-24

## 2018-08-26 DIAGNOSIS — I25.10 CORONARY ARTERY DISEASE INVOLVING NATIVE CORONARY ARTERY OF NATIVE HEART WITHOUT ANGINA PECTORIS: ICD-10-CM

## 2018-08-27 RX ORDER — METOPROLOL SUCCINATE 50 MG/1
TABLET, EXTENDED RELEASE ORAL
Qty: 90 TABLET | Refills: 3 | Status: SHIPPED | OUTPATIENT
Start: 2018-08-27 | End: 2019-06-12

## 2018-08-30 DIAGNOSIS — I25.10 CORONARY ARTERY DISEASE INVOLVING NATIVE CORONARY ARTERY OF NATIVE HEART WITHOUT ANGINA PECTORIS: ICD-10-CM

## 2018-08-30 RX ORDER — NITROGLYCERIN 0.4 MG/1
TABLET SUBLINGUAL
Qty: 25 TABLET | Refills: 0 | Status: SHIPPED | OUTPATIENT
Start: 2018-08-30 | End: 2018-09-21 | Stop reason: SDUPTHER

## 2018-09-10 ENCOUNTER — TELEPHONE (OUTPATIENT)
Dept: FAMILY MEDICINE CLINIC | Age: 68
End: 2018-09-10

## 2018-09-12 LAB
ABSOLUTE BASO #: 0 K/UL (ref 0–0.1)
ABSOLUTE EOS #: 0.1 K/UL (ref 0.1–0.4)
ABSOLUTE LYMPH #: 1.8 K/UL (ref 0.8–5.2)
ABSOLUTE MONO #: 0.6 K/UL (ref 0.1–0.9)
ABSOLUTE NEUT #: 4.1 K/UL (ref 1.3–9.1)
ALBUMIN SERPL-MCNC: 4.6 G/DL (ref 3.5–5.2)
ALK PHOSPHATASE: 60 U/L (ref 39–118)
ALT SERPL-CCNC: 26 U/L (ref 5–50)
ANION GAP SERPL CALCULATED.3IONS-SCNC: 13 MEQ/L (ref 10–19)
AST SERPL-CCNC: 18 U/L (ref 9–50)
AVERAGE GLUCOSE: 203 MG/DL (ref 66–114)
BASOPHILS RELATIVE PERCENT: 0.3 %
BILIRUB SERPL-MCNC: 0.4 MG/DL
BUN BLDV-MCNC: 21 MG/DL (ref 8–23)
C-REACTIVE PROTEIN: 0.18 MG/DL
CALCIUM SERPL-MCNC: 9.9 MG/DL (ref 8.5–10.5)
CHLORIDE BLD-SCNC: 98 MEQ/L (ref 95–107)
CHOLESTEROL/HDL RATIO: 4.7
CHOLESTEROL: 146 MG/DL
CO2: 29 MEQ/L (ref 19–31)
CREAT SERPL-MCNC: 0.9 MG/DL (ref 0.8–1.4)
CREATINE, URINE: 130.6 MG/DL (ref 38–259)
EGFR AFRICAN AMERICAN: 101.4 ML/MIN/1.73 M2
EGFR IF NONAFRICAN AMERICAN: 87.5 ML/MIN/1.73 M2
EOSINOPHILS RELATIVE PERCENT: 1.5 %
GLUCOSE: 220 MG/DL (ref 70–99)
HBA1C MFR BLD: 8.7 % (ref 4.2–5.8)
HCT VFR BLD CALC: 46.2 % (ref 41.4–51)
HDLC SERPL-MCNC: 30.8 MG/DL
HEMOGLOBIN: 16.5 G/DL (ref 13.8–17)
LDL CHOLESTEROL CALCULATED: 37 MG/DL
LDL/HDL RATIO: 1.2
LYMPHOCYTE %: 27.2 %
MCH RBC QN AUTO: 31.5 PG (ref 27–34)
MCHC RBC AUTO-ENTMCNC: 35.7 G/DL (ref 31–36)
MCV RBC AUTO: 88.2 FL (ref 80–100)
MICROALBUMIN/CREAT 24H UR: 1.7 MG/DL
MICROALBUMIN/CREAT UR-RTO: 13 MG/G
MONOCYTES # BLD: 8.8 %
NEUTROPHILS RELATIVE PERCENT: 62 %
PDW BLD-RTO: 12.6 % (ref 10.8–14.8)
PLATELETS: 259 K/UL (ref 150–450)
POTASSIUM SERPL-SCNC: 4.3 MEQ/L (ref 3.5–5.4)
RBC: 5.24 M/UL (ref 4–5.5)
RHEUMATOID FACTOR: <10 IU/ML
SEDIMENTATION RATE, ERYTHROCYTE: 9 MM/HR (ref 0–20)
SODIUM BLD-SCNC: 140 MEQ/L (ref 135–146)
TOTAL PROTEIN: 7.2 G/DL (ref 6.1–8.3)
TRIGL SERPL-MCNC: 390 MG/DL
TSH SERPL DL<=0.05 MIU/L-ACNC: 1.89 UIU/ML (ref 0.4–4.1)
VLDLC SERPL CALC-MCNC: 78 MG/DL
WBC: 6.6 K/UL (ref 3.7–10.8)

## 2018-09-13 LAB — CCP IGG ANTIBODIES: 30 UNITS

## 2018-09-21 DIAGNOSIS — I25.10 CORONARY ARTERY DISEASE INVOLVING NATIVE CORONARY ARTERY OF NATIVE HEART WITHOUT ANGINA PECTORIS: ICD-10-CM

## 2018-09-21 RX ORDER — NITROGLYCERIN 0.4 MG/1
TABLET SUBLINGUAL
Qty: 25 TABLET | Refills: 0 | Status: SHIPPED | OUTPATIENT
Start: 2018-09-21 | End: 2018-10-14 | Stop reason: SDUPTHER

## 2018-09-21 RX ORDER — VALSARTAN AND HYDROCHLOROTHIAZIDE 160; 25 MG/1; MG/1
TABLET ORAL
Qty: 90 TABLET | Refills: 3 | Status: SHIPPED | OUTPATIENT
Start: 2018-09-21 | End: 2019-08-26 | Stop reason: SDUPTHER

## 2018-09-24 ENCOUNTER — OFFICE VISIT (OUTPATIENT)
Dept: FAMILY MEDICINE CLINIC | Age: 68
End: 2018-09-24
Payer: MEDICARE

## 2018-09-24 VITALS
WEIGHT: 293.3 LBS | HEART RATE: 64 BPM | DIASTOLIC BLOOD PRESSURE: 72 MMHG | RESPIRATION RATE: 24 BRPM | SYSTOLIC BLOOD PRESSURE: 138 MMHG | BODY MASS INDEX: 43.44 KG/M2 | HEIGHT: 69 IN

## 2018-09-24 DIAGNOSIS — R76.8 POSITIVE ANTI-CCP TEST: ICD-10-CM

## 2018-09-24 DIAGNOSIS — I25.10 CORONARY ARTERY DISEASE INVOLVING NATIVE CORONARY ARTERY OF NATIVE HEART WITHOUT ANGINA PECTORIS: ICD-10-CM

## 2018-09-24 DIAGNOSIS — E66.01 MORBID OBESITY (HCC): ICD-10-CM

## 2018-09-24 DIAGNOSIS — N40.0 BENIGN PROSTATIC HYPERPLASIA, UNSPECIFIED WHETHER LOWER URINARY TRACT SYMPTOMS PRESENT: ICD-10-CM

## 2018-09-24 DIAGNOSIS — Z99.89 OBSTRUCTIVE SLEEP APNEA ON CPAP: ICD-10-CM

## 2018-09-24 DIAGNOSIS — I10 ESSENTIAL HYPERTENSION: ICD-10-CM

## 2018-09-24 DIAGNOSIS — G47.33 OBSTRUCTIVE SLEEP APNEA ON CPAP: ICD-10-CM

## 2018-09-24 DIAGNOSIS — E78.49 OTHER HYPERLIPIDEMIA: ICD-10-CM

## 2018-09-24 DIAGNOSIS — M15.9 PRIMARY OSTEOARTHRITIS INVOLVING MULTIPLE JOINTS: ICD-10-CM

## 2018-09-24 DIAGNOSIS — M19.049 HAND ARTHRITIS: ICD-10-CM

## 2018-09-24 DIAGNOSIS — K51.90 ULCERATIVE COLITIS WITHOUT COMPLICATIONS, UNSPECIFIED LOCATION (HCC): ICD-10-CM

## 2018-09-24 PROCEDURE — G8598 ASA/ANTIPLAT THER USED: HCPCS | Performed by: FAMILY MEDICINE

## 2018-09-24 PROCEDURE — 1101F PT FALLS ASSESS-DOCD LE1/YR: CPT | Performed by: FAMILY MEDICINE

## 2018-09-24 PROCEDURE — 3017F COLORECTAL CA SCREEN DOC REV: CPT | Performed by: FAMILY MEDICINE

## 2018-09-24 PROCEDURE — 1123F ACP DISCUSS/DSCN MKR DOCD: CPT | Performed by: FAMILY MEDICINE

## 2018-09-24 PROCEDURE — G8427 DOCREV CUR MEDS BY ELIG CLIN: HCPCS | Performed by: FAMILY MEDICINE

## 2018-09-24 PROCEDURE — 1036F TOBACCO NON-USER: CPT | Performed by: FAMILY MEDICINE

## 2018-09-24 PROCEDURE — 99214 OFFICE O/P EST MOD 30 MIN: CPT | Performed by: FAMILY MEDICINE

## 2018-09-24 PROCEDURE — G8417 CALC BMI ABV UP PARAM F/U: HCPCS | Performed by: FAMILY MEDICINE

## 2018-09-24 PROCEDURE — 3045F PR MOST RECENT HEMOGLOBIN A1C LEVEL 7.0-9.0%: CPT | Performed by: FAMILY MEDICINE

## 2018-09-24 PROCEDURE — 2022F DILAT RTA XM EVC RTNOPTHY: CPT | Performed by: FAMILY MEDICINE

## 2018-09-24 PROCEDURE — 4040F PNEUMOC VAC/ADMIN/RCVD: CPT | Performed by: FAMILY MEDICINE

## 2018-09-24 ASSESSMENT — PATIENT HEALTH QUESTIONNAIRE - PHQ9
SUM OF ALL RESPONSES TO PHQ9 QUESTIONS 1 & 2: 1
1. LITTLE INTEREST OR PLEASURE IN DOING THINGS: 1
SUM OF ALL RESPONSES TO PHQ QUESTIONS 1-9: 1
2. FEELING DOWN, DEPRESSED OR HOPELESS: 0
SUM OF ALL RESPONSES TO PHQ QUESTIONS 1-9: 1

## 2018-09-24 ASSESSMENT — ENCOUNTER SYMPTOMS
GASTROINTESTINAL NEGATIVE: 1
RESPIRATORY NEGATIVE: 1

## 2018-09-24 NOTE — PROGRESS NOTES
Subjective:      Patient ID: Rc Jenkins is a 75 y.o. male. HPI:    Chief Complaint   Patient presents with    6 Month Follow-Up     DM2     6 month eval.    BP well controlled. BP Readings from Last 3 Encounters:   09/24/18 138/72   01/18/18 136/82   10/19/17 136/70     Sugars continue to be very poorly controlled. Up from 6.3. Pt admits to eating very poorly. Lab Results   Component Value Date    LABA1C 8.7 (H) 09/11/2018     Weight is up a few lbs since last visit. Wt Readings from Last 3 Encounters:   09/24/18 293 lb 4.8 oz (133 kg)   01/18/18 285 lb (129.3 kg)   10/19/17 298 lb (135.2 kg)     Hx of MOSES, compliant with CPAP. UC stable. Patient Active Problem List   Diagnosis    Hyperlipidemia    HTN (hypertension)    Morbid obesity ([address]    CAD (coronary artery disease)    OA (osteoarthritis)    Ulcerative colitis ([address]    Diabetes mellitus, type II ([address]    Gout    Morbid obesity with BMI of 40.0-44.9, adult ([address]    Diabetes mellitus type II, uncontrolled ([address]    Leukocytosis    Lower urinary tract infectious disease    Acute pyelonephritis    Prostatitis    Lower urinary tract symptoms (LUTS)    BPH (benign prostatic hyperplasia)    Diabetes ([address]    Hyperlipemia    Obstructive sleep apnea on CPAP    Uncontrolled type 2 diabetes mellitus without complication, without long-term current use of insulin ([address]     Past Surgical History:   Procedure Laterality Date    CORONARY ANGIOPLASTY      CORONARY ANGIOPLASTY WITH STENT PLACEMENT  2004    stents x 2    DIAGNOSTIC CARDIAC CATH LAB PROCEDURE      HERNIA REPAIR  1966    JOINT REPLACEMENT Right 2009    rotator cuff    NASAL SINUS SURGERY  2007     Prior to Admission medications    Medication Sig Start Date End Date Taking?  Authorizing Provider   valsartan-hydrochlorothiazide (DIOVAN-HCT) 160-25 MG per tablet TAKE 1 TABLET DAILY 9/21/18  Yes Laurie Oscar,    nitroGLYCERIN (NITROSTAT) 0.4 MG SL tablet PLACE 1 TABLET UNDER THE   TONGUE AND ALLOW TO        DISSOLVE EVERY 5 MINUTES ASNEEDED FOR CHEST PAIN 9/21/18  Yes Arti Silva DO   metoprolol succinate (TOPROL XL) 50 MG extended release tablet TAKE 1 TABLET DAILY 8/27/18  Yes Arti Silva DO   pravastatin (PRAVACHOL) 10 MG tablet TAKE 1 TABLET DAILY 6/6/18  Yes Arti Silva DO   JANUMET  MG per tablet TAKE 1 TABLET TWICE DAILY  WITH MEALS 4/9/18  Yes Arti Silva DO   glimepiride (AMARYL) 2 MG tablet TAKE 1 TABLET TWICE A DAY 4/9/18  Yes Arti Silva DO   celecoxib (CELEBREX) 200 MG capsule Take 1 capsule by mouth daily 1/18/18  Yes Arti Silva DO   CO ENZYME Q-10 PO Take 1 capsule by mouth daily   Yes Historical Provider, MD   tamsulosin (FLOMAX) 0.4 MG capsule TAKE 1 CAPSULE AS NEEDED 6/5/17  Yes Arti Silva DO   clopidogrel (PLAVIX) 75 MG tablet Take 1 tablet by mouth daily 4/25/17  Yes Arti Silva DO   metoprolol succinate (TOPROL XL) 50 MG extended release tablet Take 1 tablet by mouth daily 4/25/17  Yes Arti Silva DO   RELION GLUCOSE TEST STRIPS strip Check sugars 1-2 times daily. Dx: 250.02 9/26/14  Yes Arti Silva DO   ReliOn Ultra Thin Lancets MISC Check sugars 1-2 times daily. Dx: 250.02 9/26/14  Yes Arti Silva DO   Blood Glucose Monitoring Suppl (RELION CONFIRM GLUCOSE MONITOR) W/DEVICE KIT Relion monitor. Dx:  250.02 2/13/14  Yes Arti Silva DO   aspirin 81 MG chewable tablet Take 1 tablet by mouth daily.  7/2/13  Yes Arti Silva DO   valsartan-hydrochlorothiazide (DIOVAN HCT) 160-25 MG per tablet Take 1 tablet by mouth daily 4/25/17 4/25/18  Arti Silva DO       Lab Results   Component Value Date    LABA1C 8.7 (H) 09/11/2018     No results found for: EAG    No components found for: CHLPL  Lab Results   Component Value Date    TRIG 390 (H) 09/11/2018    TRIG 450 (H) 10/11/2017    TRIG 584 (H) 07/14/2016     Lab Results Component Value Date    HDL 30.8 (L) 09/11/2018    HDL 31 (L) 10/11/2017    HDL 28 (L) 07/14/2016     Lab Results   Component Value Date    LDLCALC 37 09/11/2018    LDLCALC 36 04/15/2016    LDLCALC 66 07/08/2014     Lab Results   Component Value Date    LABVLDL 78 (H) 09/11/2018    LABVLDL 79 (H) 04/15/2016         Chemistry        Component Value Date/Time     09/11/2018 1241    K 4.3 09/11/2018 1241    CL 98 09/11/2018 1241    CO2 29 09/11/2018 1241    BUN 21 09/11/2018 1241    CREATININE 0.9 09/11/2018 1241        Component Value Date/Time    CALCIUM 9.9 09/11/2018 1241    ALKPHOS 60 09/11/2018 1241    ALKPHOS 63 06/02/2013 0921    AST 18 09/11/2018 1241    ALT 26 09/11/2018 1241    BILITOT 0.4 09/11/2018 1241            Lab Results   Component Value Date    TSH 1.89 09/11/2018       Lab Results   Component Value Date    WBC 6.6 09/11/2018    HGB 16.5 09/11/2018    HCT 46.2 09/11/2018    MCV 88.2 09/11/2018     09/11/2018         Health Maintenance   Topic Date Due    AAA screen  1950    DTaP/Tdap/Td vaccine (1 - Tdap) 01/02/2013    Shingles Vaccine (1 of 2 - 2 Dose Series) 11/26/2014    Flu vaccine (1) 09/01/2018    Diabetic foot exam  10/19/2018    Diabetic retinal exam  07/09/2019    A1C test (Diabetic or Prediabetic)  09/11/2019    Diabetic microalbuminuria test  09/11/2019    Lipid screen  09/11/2019    Potassium monitoring  09/11/2019    Creatinine monitoring  09/11/2019    Colon cancer screen colonoscopy  01/01/2021    Pneumococcal low/med risk  Completed    Hepatitis C screen  Addressed       Immunization History   Administered Date(s) Administered    Influenza Virus Vaccine 09/26/2014    Pneumococcal 13-valent Conjugate ([id number]) 02/10/2015    Pneumococcal Polysaccharide ([id number]) 07/19/2016    Td 01/01/2013    Zoster Live (Zostavax) 09/26/2014           Review of Systems   Constitutional: Negative. HENT: Negative. Respiratory: Negative. he has RA, OA much more likely based on exam  -  RTO 6 mos    Quality & Risk Score Accuracy    Visit Dx:  E11.65 - Uncontrolled type 2 diabetes mellitus without complication, without long-term current use of insulin ([facility])  Assessment and plan: Worsening based upon symptoms and exam. Treatment plan as follows: dietary chantes Follow up in 6 months. Visit Dx:  E11.65 - Uncontrolled diabetes mellitus type 2 without complications, unspecified whether long term insulin use  Assessment and plan: Worsening based upon symptoms and exam. Treatment plan as follows: dietary changes Follow up in 6 months.   Last edited 09/24/18 11:27 EDT by DO Vicky Gregg DO

## 2018-09-25 ENCOUNTER — CARE COORDINATION (OUTPATIENT)
Dept: CARE COORDINATION | Age: 68
End: 2018-09-25

## 2018-09-25 NOTE — CARE COORDINATION
Ambulatory Care Coordination Note  9/25/2018  CM Risk Score: 5  Sky Mortality Risk Score: 3.55    ACC: Ana Moran, RN    Summary Note: spoke with Ana Naylor today for f/u. Pt was seen in office yesterday for f/u appt. Pt was seen earlier than scheduled and had already been d/c when I went to see pt. Talked with him today. Most recent A1C elevated 8.4. Since last conversation pt has been working on improving his diet. Still working on job in [place] in [place] causing him to be doing a lot of traveling. This has made it difficult to get to the gym consistently each week. Has went a few times since last speaking to pt. Reinforced importance of incorporating exercise and encouraged to get back in to a regimen. Pt hoping that job will be completed this wk and he will be able to focus more on exercise. Admits that diet continues to be a struggle. Did receive web sites for him to review and find new recipes. States that he has not had chance to look at them yet ,but plans to. Having Nutribullet each morning for breakfast.  Tries to eat a piece of toast or small bowl of cereal with it d/t limited amt of carbs in drink. Eating sandwich most often for lunch. Fixes own meal for supper. Will snack late at night, but not typically making healthy choice. Encouraged having small snack at bedtime, but to try to eat no more than 20gms per carbs. Encouraged to incorporate protein with it. Suggested PB and crackers, cheese and crackers, 1/2 of sandwich, etc.  Offered support and encouragement. Encouraged him to continue working at bringing BS's down. Reports BS's have been averaging in the 200's with some in the 100 range. Reinforced importance of diet and exercise to help get BS's under control. Pt very well aware and has been able to improve BS's in past with diet and exercise. Encouraged him to do it again. Encouraged to call with any questions or concerns.    Diabetes Assessment    Medic Alert ID:  No  Meal Planning:  Avoidance of concentrated sweets, Other   How often do you test your blood sugar?:  Other (Comment: checking 2-3 times per day)   Do you have barriers with adherence to non-pharmacologic self-management interventions? (Nutrition/Exercise/Self-Monitoring):  Yes   Have you ever had to go to the ED for symptoms of low blood sugar?:  No       Do you have hyperglycemia symptoms?:  No   Do you have hypoglycemia symptoms?:  No   Last Blood Sugar Value:  219   Blood Sugar Trends:  (Comment: pt reports slow reduction in BS's)                Care Coordination Interventions    Program Enrollment:  Complex Care  Referral from Primary Care Provider:  No  Suggested Interventions and Community Resources  Zone Management Tools:  Completed (Comment: reviewed Diabetes and COPD zone mgmt. mailed to pt. )         Goals Addressed             Most Recent     Conditions and Symptoms   On track (9/25/2018)             I will schedule office visits, as directed by my provider. I will keep my appointment or reschedule if I have to cancel. I will notify my provider of any barriers to my plan of care. I will follow my Zone Management tool to seek urgent or emergent care. I will notify my provider of any symptoms that indicate a worsening of my condition. Barriers: time constraints  Plan for overcoming my barriers: use calendar/planner and schedule ahead if possible. Confidence: 6/10  Anticipated Goal Completion Date: 2/22/18         Self Monitoring   Worsening (9/25/2018)             Self-Monitored Blood Glucose - I will check my blood sugar Fasting blood sugar    None Recently Recorded    Barriers: fear of failure  Plan for overcoming my barriers: reviewed importance of checking BS's. Get back in to routine  Confidence: 6/10  Anticipated Goal Completion Date: 1/18/17              Prior to Admission medications    Medication Sig Start Date End Date Taking?  Authorizing Provider   valsartan-hydrochlorothiazide

## 2018-10-05 ENCOUNTER — CARE COORDINATION (OUTPATIENT)
Dept: CASE MANAGEMENT | Age: 68
End: 2018-10-05

## 2018-10-09 DIAGNOSIS — I25.10 CORONARY ARTERY DISEASE INVOLVING NATIVE CORONARY ARTERY OF NATIVE HEART WITHOUT ANGINA PECTORIS: ICD-10-CM

## 2018-10-09 RX ORDER — CLOPIDOGREL BISULFATE 75 MG/1
TABLET ORAL
Qty: 90 TABLET | Refills: 3 | Status: SHIPPED | OUTPATIENT
Start: 2018-10-09 | End: 2019-06-12

## 2018-10-14 DIAGNOSIS — I25.10 CORONARY ARTERY DISEASE INVOLVING NATIVE CORONARY ARTERY OF NATIVE HEART WITHOUT ANGINA PECTORIS: ICD-10-CM

## 2018-10-15 RX ORDER — NITROGLYCERIN 0.4 MG/1
TABLET SUBLINGUAL
Qty: 25 TABLET | Refills: 0 | Status: SHIPPED | OUTPATIENT
Start: 2018-10-15 | End: 2018-11-06 | Stop reason: SDUPTHER

## 2018-10-17 ENCOUNTER — NURSE ONLY (OUTPATIENT)
Dept: FAMILY MEDICINE CLINIC | Age: 68
End: 2018-10-17
Payer: MEDICARE

## 2018-10-17 DIAGNOSIS — Z23 FLU VACCINE NEED: Primary | ICD-10-CM

## 2018-10-17 PROCEDURE — G0008 ADMIN INFLUENZA VIRUS VAC: HCPCS | Performed by: FAMILY MEDICINE

## 2018-10-17 PROCEDURE — 90662 IIV NO PRSV INCREASED AG IM: CPT | Performed by: FAMILY MEDICINE

## 2018-11-01 ENCOUNTER — CARE COORDINATION (OUTPATIENT)
Dept: CARE COORDINATION | Age: 68
End: 2018-11-01

## 2018-11-01 NOTE — CARE COORDINATION
Attempted to reach [facility] for f/u. No answer. Message left to return call.

## 2018-11-06 DIAGNOSIS — I25.10 CORONARY ARTERY DISEASE INVOLVING NATIVE CORONARY ARTERY OF NATIVE HEART WITHOUT ANGINA PECTORIS: ICD-10-CM

## 2018-11-06 RX ORDER — NITROGLYCERIN 0.4 MG/1
TABLET SUBLINGUAL
Qty: 25 TABLET | Refills: 0 | Status: SHIPPED | OUTPATIENT
Start: 2018-11-06 | End: 2018-11-30 | Stop reason: SDUPTHER

## 2018-11-30 DIAGNOSIS — I25.10 CORONARY ARTERY DISEASE INVOLVING NATIVE CORONARY ARTERY OF NATIVE HEART WITHOUT ANGINA PECTORIS: ICD-10-CM

## 2018-12-01 RX ORDER — NITROGLYCERIN 0.4 MG/1
TABLET SUBLINGUAL
Qty: 25 TABLET | Refills: 0 | Status: SHIPPED | OUTPATIENT
Start: 2018-12-01 | End: 2018-12-22 | Stop reason: SDUPTHER

## 2018-12-06 ENCOUNTER — CARE COORDINATION (OUTPATIENT)
Dept: CARE COORDINATION | Age: 68
End: 2018-12-06

## 2018-12-22 DIAGNOSIS — I25.10 CORONARY ARTERY DISEASE INVOLVING NATIVE CORONARY ARTERY OF NATIVE HEART WITHOUT ANGINA PECTORIS: ICD-10-CM

## 2018-12-24 RX ORDER — NITROGLYCERIN 0.4 MG/1
TABLET SUBLINGUAL
Qty: 25 TABLET | Refills: 0 | Status: SHIPPED | OUTPATIENT
Start: 2018-12-24 | End: 2019-01-14 | Stop reason: SDUPTHER

## 2019-01-14 DIAGNOSIS — I25.10 CORONARY ARTERY DISEASE INVOLVING NATIVE CORONARY ARTERY OF NATIVE HEART WITHOUT ANGINA PECTORIS: ICD-10-CM

## 2019-01-14 RX ORDER — NITROGLYCERIN 0.4 MG/1
TABLET SUBLINGUAL
Qty: 25 TABLET | Refills: 0 | Status: SHIPPED | OUTPATIENT
Start: 2019-01-14 | End: 2019-02-06 | Stop reason: SDUPTHER

## 2019-01-24 ENCOUNTER — CARE COORDINATION (OUTPATIENT)
Dept: CARE COORDINATION | Age: 69
End: 2019-01-24

## 2019-02-01 DIAGNOSIS — M19.041 OSTEOARTHRITIS OF FINGERS OF HANDS, BILATERAL: ICD-10-CM

## 2019-02-01 DIAGNOSIS — M19.042 OSTEOARTHRITIS OF FINGERS OF HANDS, BILATERAL: ICD-10-CM

## 2019-02-01 RX ORDER — CELECOXIB 200 MG/1
CAPSULE ORAL
Qty: 90 CAPSULE | Refills: 3 | Status: SHIPPED | OUTPATIENT
Start: 2019-02-01 | End: 2020-02-10

## 2019-02-06 DIAGNOSIS — I25.10 CORONARY ARTERY DISEASE INVOLVING NATIVE CORONARY ARTERY OF NATIVE HEART WITHOUT ANGINA PECTORIS: ICD-10-CM

## 2019-02-06 RX ORDER — NITROGLYCERIN 0.4 MG/1
TABLET SUBLINGUAL
Qty: 25 TABLET | Refills: 0 | Status: SHIPPED | OUTPATIENT
Start: 2019-02-06 | End: 2019-03-03 | Stop reason: SDUPTHER

## 2019-03-03 DIAGNOSIS — I25.10 CORONARY ARTERY DISEASE INVOLVING NATIVE CORONARY ARTERY OF NATIVE HEART WITHOUT ANGINA PECTORIS: ICD-10-CM

## 2019-03-04 RX ORDER — NITROGLYCERIN 0.4 MG/1
TABLET SUBLINGUAL
Qty: 25 TABLET | Refills: 0 | Status: SHIPPED | OUTPATIENT
Start: 2019-03-04 | End: 2020-12-02 | Stop reason: SDUPTHER

## 2019-03-07 DIAGNOSIS — N40.1 BENIGN NON-NODULAR PROSTATIC HYPERPLASIA WITH LOWER URINARY TRACT SYMPTOMS: ICD-10-CM

## 2019-03-08 RX ORDER — SITAGLIPTIN AND METFORMIN HYDROCHLORIDE 1000; 50 MG/1; MG/1
TABLET, FILM COATED ORAL
Qty: 180 TABLET | Refills: 3 | Status: SHIPPED | OUTPATIENT
Start: 2019-03-08 | End: 2019-11-21 | Stop reason: SDUPTHER

## 2019-03-08 RX ORDER — GLIMEPIRIDE 2 MG/1
TABLET ORAL
Qty: 180 TABLET | Refills: 3 | Status: SHIPPED | OUTPATIENT
Start: 2019-03-08 | End: 2020-05-08

## 2019-03-08 RX ORDER — TAMSULOSIN HYDROCHLORIDE 0.4 MG/1
CAPSULE ORAL
Qty: 90 CAPSULE | Refills: 3 | Status: SHIPPED | OUTPATIENT
Start: 2019-03-08 | End: 2019-06-12

## 2019-03-19 ENCOUNTER — TELEPHONE (OUTPATIENT)
Dept: FAMILY MEDICINE CLINIC | Age: 69
End: 2019-03-19

## 2019-04-16 ENCOUNTER — TELEPHONE (OUTPATIENT)
Dept: FAMILY MEDICINE CLINIC | Age: 69
End: 2019-04-16

## 2019-04-16 NOTE — TELEPHONE ENCOUNTER
L/M for pt to call office. Called patient for pre visit planning. Pt has appt on 4/23/19 at 11am.  Please remind pt of appt date and time and check to see if they had their lab work done.

## 2019-06-01 DIAGNOSIS — E78.5 HYPERLIPIDEMIA, UNSPECIFIED HYPERLIPIDEMIA TYPE: ICD-10-CM

## 2019-06-03 RX ORDER — PRAVASTATIN SODIUM 10 MG
TABLET ORAL
Qty: 90 TABLET | Refills: 3 | Status: SHIPPED | OUTPATIENT
Start: 2019-06-03 | End: 2020-05-08

## 2019-06-11 ENCOUNTER — TELEPHONE (OUTPATIENT)
Dept: FAMILY MEDICINE CLINIC | Age: 69
End: 2019-06-11

## 2019-06-11 NOTE — TELEPHONE ENCOUNTER
L/M for pt to call office. Called patient for pre visit planning. Pt has appt on 6/17/19 at 10:45am.  Please remind pt of appt date and time and check to see if they had their lab work done.

## 2019-06-12 ENCOUNTER — OFFICE VISIT (OUTPATIENT)
Dept: FAMILY MEDICINE CLINIC | Age: 69
End: 2019-06-12
Payer: MEDICARE

## 2019-06-12 VITALS
WEIGHT: 298.9 LBS | SYSTOLIC BLOOD PRESSURE: 134 MMHG | TEMPERATURE: 98.5 F | HEART RATE: 84 BPM | DIASTOLIC BLOOD PRESSURE: 78 MMHG | OXYGEN SATURATION: 96 % | BODY MASS INDEX: 44.79 KG/M2

## 2019-06-12 DIAGNOSIS — J40 TRACHEOBRONCHITIS: Primary | ICD-10-CM

## 2019-06-12 PROCEDURE — 4040F PNEUMOC VAC/ADMIN/RCVD: CPT | Performed by: FAMILY MEDICINE

## 2019-06-12 PROCEDURE — G8427 DOCREV CUR MEDS BY ELIG CLIN: HCPCS | Performed by: FAMILY MEDICINE

## 2019-06-12 PROCEDURE — 99213 OFFICE O/P EST LOW 20 MIN: CPT | Performed by: FAMILY MEDICINE

## 2019-06-12 PROCEDURE — 3017F COLORECTAL CA SCREEN DOC REV: CPT | Performed by: FAMILY MEDICINE

## 2019-06-12 PROCEDURE — G8417 CALC BMI ABV UP PARAM F/U: HCPCS | Performed by: FAMILY MEDICINE

## 2019-06-12 PROCEDURE — 1036F TOBACCO NON-USER: CPT | Performed by: FAMILY MEDICINE

## 2019-06-12 PROCEDURE — G8598 ASA/ANTIPLAT THER USED: HCPCS | Performed by: FAMILY MEDICINE

## 2019-06-12 PROCEDURE — G8510 SCR DEP NEG, NO PLAN REQD: HCPCS | Performed by: FAMILY MEDICINE

## 2019-06-12 PROCEDURE — 1123F ACP DISCUSS/DSCN MKR DOCD: CPT | Performed by: FAMILY MEDICINE

## 2019-06-12 RX ORDER — BENZONATATE 100 MG/1
100 CAPSULE ORAL 3 TIMES DAILY PRN
Qty: 15 CAPSULE | Refills: 0 | Status: SHIPPED | OUTPATIENT
Start: 2019-06-12 | End: 2019-06-17 | Stop reason: SDUPTHER

## 2019-06-12 RX ORDER — ALBUTEROL SULFATE 90 UG/1
2 AEROSOL, METERED RESPIRATORY (INHALATION) EVERY 6 HOURS PRN
Qty: 1 INHALER | Refills: 0 | Status: SHIPPED | OUTPATIENT
Start: 2019-06-12

## 2019-06-12 ASSESSMENT — ENCOUNTER SYMPTOMS
GASTROINTESTINAL NEGATIVE: 1
WHEEZING: 1
CHEST TIGHTNESS: 1

## 2019-06-12 ASSESSMENT — PATIENT HEALTH QUESTIONNAIRE - PHQ9
SUM OF ALL RESPONSES TO PHQ9 QUESTIONS 1 & 2: 0
SUM OF ALL RESPONSES TO PHQ QUESTIONS 1-9: 0
1. LITTLE INTEREST OR PLEASURE IN DOING THINGS: 0
2. FEELING DOWN, DEPRESSED OR HOPELESS: 0
SUM OF ALL RESPONSES TO PHQ QUESTIONS 1-9: 0

## 2019-06-12 NOTE — PROGRESS NOTES
Rolinda Cancer, DO   JANUMET  MG per tablet TAKE 1 TABLET TWICE DAILY  WITH MEALS 3/8/19  Yes Rolinda Cancer, DO   nitroGLYCERIN (NITROSTAT) 0.4 MG SL tablet PLACE 1 TABLET UNDER THE   TONGUE AND ALLOW TO        DISSOLVE EVERY 5 MINUTES ASNEEDED FOR CHEST PAIN 3/4/19  Yes Rolinda Cancer, DO   celecoxib (CELEBREX) 200 MG capsule TAKE 1 CAPSULE DAILY 2/1/19  Yes Rolinda Cancer, DO   valsartan-hydrochlorothiazide (DIOVAN-HCT) 160-25 MG per tablet TAKE 1 TABLET DAILY 9/21/18  Yes Rolinda Cancer, DO   CO ENZYME Q-10 PO Take 1 capsule by mouth daily   Yes Historical Provider, MD   tamsulosin (FLOMAX) 0.4 MG capsule TAKE 1 CAPSULE AS NEEDED 6/5/17  Yes Rolinda Cancer, DO   clopidogrel (PLAVIX) 75 MG tablet Take 1 tablet by mouth daily 4/25/17  Yes Rolinda Cancer, DO   metoprolol succinate (TOPROL XL) 50 MG extended release tablet Take 1 tablet by mouth daily 4/25/17  Yes Rolinda Cancer, DO   RELION GLUCOSE TEST STRIPS strip Check sugars 1-2 times daily. Dx: 250.02 9/26/14  Yes Rolinda Cancer, DO   ReliOn Ultra Thin Lancets MISC Check sugars 1-2 times daily. Dx: 250.02 9/26/14  Yes Rolinda Cancer, DO   Blood Glucose Monitoring Suppl (RELION CONFIRM GLUCOSE MONITOR) W/DEVICE KIT Relion monitor. Dx:  250.02 2/13/14  Yes Rolinda Cancer, DO   aspirin 81 MG chewable tablet Take 1 tablet by mouth daily. 7/2/13  Yes Rolinda Cancer, DO         Review of Systems   Constitutional: Positive for chills. Negative for fever. HENT: Positive for congestion. Respiratory: Positive for chest tightness and wheezing. Cardiovascular: Negative. Negative for chest pain and palpitations. Gastrointestinal: Negative. Musculoskeletal: Negative. All other systems reviewed and are negative. Objective:   Physical Exam   Constitutional: He is oriented to person, place, and time. He appears well-developed and well-nourished.    HENT:   Head: Normocephalic and atraumatic. Right Ear: Tympanic membrane normal.   Left Ear: Tympanic membrane normal.   Mouth/Throat: Oropharynx is clear and moist and mucous membranes are normal.   Cardiovascular: Normal rate, regular rhythm and normal heart sounds. No murmur heard. Pulmonary/Chest: Effort normal and breath sounds normal.   Abdominal: Soft. Bowel sounds are normal.   Musculoskeletal: He exhibits no edema. Neurological: He is alert and oriented to person, place, and time. Skin: Skin is warm and dry. Psychiatric: He has a normal mood and affect. His behavior is normal.   Nursing note and vitals reviewed. Assessment:       Diagnosis Orders   1.  Tracheobronchitis  benzonatate (TESSALON) 100 MG capsule    albuterol sulfate HFA (VENTOLIN HFA) 108 (90 Base) MCG/ACT inhaler           Plan:      -  Viral etiology discussed  -  No steroids due to hyperglycemia  -  Orders above  -  Pt overdue for A1C, will get this done sometime next week and follow up with me after that        Aidan Fox, DO

## 2019-06-12 NOTE — PROGRESS NOTES
Visit Information    Have you changed or started any medications since your last visit including any over-the-counter medicines, vitamins, or herbal medicines? no   Are you having any side effects from any of your medications? -  no  Have you stopped taking any of your medications? Is so, why? -  no    Have you seen any other physician or provider since your last visit? No  Have you had any other diagnostic tests since your last visit? No  Have you been seen in the emergency room and/or had an admission to a hospital since we last saw you? No  Have you had your routine dental cleaning in the past 6 months? No/a  Have you activated your [employer] account? If not, what are your barriers?  Yes     Patient Care Team:  Jl Rob DO as PCP - General (Family Medicine)  Jl Rob DO as PCP - [facility] Provider    Medical History Review  Past Medical, Family, and Social History reviewed and does not contribute to the patient presenting condition    Health Maintenance   Topic Date Due    AAA screen  1950    DTaP/Tdap/Td vaccine (1 - Tdap) 01/02/2013    Shingles Vaccine (2 of 3) 11/21/2014    Diabetic foot exam  10/19/2018    Diabetic retinal exam  07/09/2019    A1C test (Diabetic or Prediabetic)  09/11/2019    Diabetic microalbuminuria test  09/11/2019    Lipid screen  09/11/2019    Potassium monitoring  09/11/2019    Creatinine monitoring  09/11/2019    Colon cancer screen colonoscopy  01/01/2021    Flu vaccine  Completed    Pneumococcal 65+ years Vaccine  Completed    Hepatitis C screen  Addressed

## 2019-06-12 NOTE — PATIENT INSTRUCTIONS
You may receive a survey regarding the care you received during your visit. Your input is valuable to us. We encourage you to complete and return your survey. We hope you will choose us in the future for your healthcare needs. Patient Education        Bronchitis: Care Instructions  Your Care Instructions    Bronchitis is inflammation of the bronchial tubes, which carry air to the lungs. The tubes swell and produce mucus, or phlegm. The mucus and inflamed bronchial tubes make you cough. You may have trouble breathing. Most cases of bronchitis are caused by viruses like those that cause colds. Antibiotics usually do not help and they may be harmful. Bronchitis usually develops rapidly and lasts about 2 to 3 weeks in otherwise healthy people. Follow-up care is a key part of your treatment and safety. Be sure to make and go to all appointments, and call your doctor if you are having problems. It's also a good idea to know your test results and keep a list of the medicines you take. How can you care for yourself at home? · Take all medicines exactly as prescribed. Call your doctor if you think you are having a problem with your medicine. · Get some extra rest.  · Take an over-the-counter pain medicine, such as acetaminophen (Tylenol), ibuprofen (Advil, Motrin), or naproxen (Aleve) to reduce fever and relieve body aches. Read and follow all instructions on the label. · Do not take two or more pain medicines at the same time unless the doctor told you to. Many pain medicines have acetaminophen, which is Tylenol. Too much acetaminophen (Tylenol) can be harmful. · Take an over-the-counter cough medicine that contains dextromethorphan to help quiet a dry, hacking cough so that you can sleep. Avoid cough medicines that have more than one active ingredient. Read and follow all instructions on the label. · Breathe moist air from a humidifier, hot shower, or sink filled with hot water.  The heat and moisture will thin mucus so you can cough it out. · Do not smoke. Smoking can make bronchitis worse. If you need help quitting, talk to your doctor about stop-smoking programs and medicines. These can increase your chances of quitting for good. When should you call for help? Call 911 anytime you think you may need emergency care. For example, call if:    · You have severe trouble breathing.    Call your doctor now or seek immediate medical care if:    · You have new or worse trouble breathing.     · You cough up dark brown or bloody mucus (sputum).     · You have a new or higher fever.     · You have a new rash.    Watch closely for changes in your health, and be sure to contact your doctor if:    · You cough more deeply or more often, especially if you notice more mucus or a change in the color of your mucus.     · You are not getting better as expected. Where can you learn more? Go to https://Jazz PharmaceuticalspeQteroseb.OyaGen. org and sign in to your [employer] account. Enter H333 in the AnTuTu box to learn more about \"Bronchitis: Care Instructions. \"     If you do not have an account, please click on the \"Sign Up Now\" link. Current as of: September 5, 2018  Content Version: 12.0  © 0644-8342 Healthwise, Incorporated. Care instructions adapted under license by [facility] [employer] [facility] ([facility]). If you have questions about a medical condition or this instruction, always ask your healthcare professional. [place] [address] any warranty or liability for your use of this information.

## 2019-06-15 LAB
AVERAGE GLUCOSE: 240 MG/DL (ref 66–114)
HBA1C MFR BLD: 10 %

## 2019-06-17 ENCOUNTER — OFFICE VISIT (OUTPATIENT)
Dept: FAMILY MEDICINE CLINIC | Age: 69
End: 2019-06-17
Payer: MEDICARE

## 2019-06-17 VITALS
SYSTOLIC BLOOD PRESSURE: 136 MMHG | TEMPERATURE: 97.6 F | WEIGHT: 297.9 LBS | RESPIRATION RATE: 20 BRPM | BODY MASS INDEX: 44.64 KG/M2 | OXYGEN SATURATION: 98 % | DIASTOLIC BLOOD PRESSURE: 82 MMHG | HEART RATE: 82 BPM

## 2019-06-17 DIAGNOSIS — E78.5 HYPERLIPIDEMIA, UNSPECIFIED HYPERLIPIDEMIA TYPE: ICD-10-CM

## 2019-06-17 DIAGNOSIS — Z99.89 OBSTRUCTIVE SLEEP APNEA ON CPAP: ICD-10-CM

## 2019-06-17 DIAGNOSIS — K51.90 ULCERATIVE COLITIS WITHOUT COMPLICATIONS, UNSPECIFIED LOCATION (HCC): ICD-10-CM

## 2019-06-17 DIAGNOSIS — Z12.5 SCREENING FOR PROSTATE CANCER: ICD-10-CM

## 2019-06-17 DIAGNOSIS — J40 TRACHEOBRONCHITIS: Primary | ICD-10-CM

## 2019-06-17 DIAGNOSIS — I25.10 CORONARY ARTERY DISEASE INVOLVING NATIVE CORONARY ARTERY OF NATIVE HEART WITHOUT ANGINA PECTORIS: ICD-10-CM

## 2019-06-17 DIAGNOSIS — E66.01 MORBID OBESITY WITH BMI OF 40.0-44.9, ADULT (HCC): ICD-10-CM

## 2019-06-17 DIAGNOSIS — N40.1 BENIGN NON-NODULAR PROSTATIC HYPERPLASIA WITH LOWER URINARY TRACT SYMPTOMS: ICD-10-CM

## 2019-06-17 DIAGNOSIS — G47.33 OBSTRUCTIVE SLEEP APNEA ON CPAP: ICD-10-CM

## 2019-06-17 PROCEDURE — G8598 ASA/ANTIPLAT THER USED: HCPCS | Performed by: FAMILY MEDICINE

## 2019-06-17 PROCEDURE — 99214 OFFICE O/P EST MOD 30 MIN: CPT | Performed by: FAMILY MEDICINE

## 2019-06-17 PROCEDURE — G8417 CALC BMI ABV UP PARAM F/U: HCPCS | Performed by: FAMILY MEDICINE

## 2019-06-17 PROCEDURE — 1123F ACP DISCUSS/DSCN MKR DOCD: CPT | Performed by: FAMILY MEDICINE

## 2019-06-17 PROCEDURE — 2022F DILAT RTA XM EVC RTNOPTHY: CPT | Performed by: FAMILY MEDICINE

## 2019-06-17 PROCEDURE — 3017F COLORECTAL CA SCREEN DOC REV: CPT | Performed by: FAMILY MEDICINE

## 2019-06-17 PROCEDURE — 1036F TOBACCO NON-USER: CPT | Performed by: FAMILY MEDICINE

## 2019-06-17 PROCEDURE — G8427 DOCREV CUR MEDS BY ELIG CLIN: HCPCS | Performed by: FAMILY MEDICINE

## 2019-06-17 PROCEDURE — 4040F PNEUMOC VAC/ADMIN/RCVD: CPT | Performed by: FAMILY MEDICINE

## 2019-06-17 PROCEDURE — 3046F HEMOGLOBIN A1C LEVEL >9.0%: CPT | Performed by: FAMILY MEDICINE

## 2019-06-17 RX ORDER — BENZONATATE 100 MG/1
100 CAPSULE ORAL 3 TIMES DAILY PRN
Qty: 15 CAPSULE | Refills: 0 | Status: SHIPPED | OUTPATIENT
Start: 2019-06-17 | End: 2019-06-22

## 2019-06-17 ASSESSMENT — ENCOUNTER SYMPTOMS
GASTROINTESTINAL NEGATIVE: 1
CHEST TIGHTNESS: 1
COUGH: 1

## 2019-06-17 NOTE — PATIENT INSTRUCTIONS
teach you how to keep track of the amount of carbs you eat. This is called carbohydrate counting. · If you are not sure how to count carbohydrate grams, use the Plate Method to plan meals. It is a good, quick way to make sure that you have a balanced meal. It also helps you spread carbs throughout the day. ? Divide your plate by types of foods. Put non-starchy vegetables on half the plate, meat or other protein food on one-quarter of the plate, and a grain or starchy vegetable in the final quarter of the plate. To this you can add a small piece of fruit and 1 cup of milk or yogurt, depending on how many carbs you are supposed to eat at a meal.  · Try to eat about the same amount of carbs at each meal. Do not \"save up\" your daily allowance of carbs to eat at one meal.  · Proteins have very little or no carbs per serving. Examples of proteins are beef, chicken, turkey, fish, eggs, tofu, cheese, cottage cheese, and peanut butter. A serving size of meat is 3 ounces, which is about the size of a deck of cards. Examples of meat substitute serving sizes (equal to 1 ounce of meat) are 1/4 cup of cottage cheese, 1 egg, 1 tablespoon of peanut butter, and ½ cup of tofu. How can you eat out and still eat healthy? · Learn to estimate the serving sizes of foods that have carbohydrate. If you measure food at home, it will be easier to estimate the amount in a serving of restaurant food. · If the meal you order has too much carbohydrate (such as potatoes, corn, or baked beans), ask to have a low-carbohydrate food instead. Ask for a salad or green vegetables. · If you use insulin, check your blood sugar before and after eating out to help you plan how much to eat in the future. · If you eat more carbohydrate at a meal than you had planned, take a walk or do other exercise. This will help lower your blood sugar. What else should you know? · Limit saturated fat, such as the fat from meat and dairy products.  This is a healthy choice because people who have diabetes are at higher risk of heart disease. So choose lean cuts of meat and nonfat or low-fat dairy products. Use olive or canola oil instead of butter or shortening when cooking. · Don't skip meals. Your blood sugar may drop too low if you skip meals and take insulin or certain medicines for diabetes. · Check with your doctor before you drink alcohol. Alcohol can cause your blood sugar to drop too low. Alcohol can also cause a bad reaction if you take certain diabetes medicines. Follow-up care is a key part of your treatment and safety. Be sure to make and go to all appointments, and call your doctor if you are having problems. It's also a good idea to know your test results and keep a list of the medicines you take. Where can you learn more? Go to https://Virdocs Softwareoli.Electro Power Systems. org and sign in to your [employer]Events account. Enter Y799 in the Avogy box to learn more about \"Learning About Diabetes Food Guidelines. \"     If you do not have an account, please click on the \"Sign Up Now\" link. Current as of: July 25, 2018  Content Version: 12.0  © 0125-3651 [employer]. Care instructions adapted under license by [facility] ([facility]). If you have questions about a medical condition or this instruction, always ask your healthcare professional. [address] any warranty or liability for your use of this information. Patient Education        Learning About Diuretics for High Blood Pressure  Introduction  Diuretics help to lower blood pressure. This reduces your risk of a heart attack and stroke. It also reduces your risk of kidney disease. Diuretics cause your kidneys to remove sodium and water. They also relax the blood vessel walls. These help lower your blood pressure. Examples  · Chlorthalidone  · Hydrochlorothiazide  Possible side effects  There are some common side effects. They are:  · Too little potassium.   · Feeling dizzy.  · Rash. · Urinating a lot. · High blood sugar. (But this is not common.)  You may have other side effects. Check the information that comes with your medicine. What to know about taking this medicine  · You may take other medicines for blood pressure. Diuretics can help those work better. They can also prevent extra fluid in your body. · You may need to take potassium pills. Or you may have to watch how much potassium is in your food. Ask your doctor about this. · You may need blood tests to check your kidneys and your potassium level. · Take your medicines exactly as prescribed. Call your doctor if you think you are having a problem with your medicine. · Check with your doctor or pharmacist before you use any other medicines. This includes over-the-counter medicines. Make sure your doctor knows all of the medicines, vitamins, herbal products, and supplements you take. Taking some medicines together can cause problems. Where can you learn more? Go to https://Waddle.ikaSystems. org and sign in to your [employer] account. Enter V211 in the F[employer] Corporation box to learn more about \"Learning About Diuretics for High Blood Pressure. \"     If you do not have an account, please click on the \"Sign Up Now\" link. Current as of: July 22, 2018  Content Version: 12.0  © 8297-5166 Healthwise, Incorporated. Care instructions adapted under license by [facility] ([facility]). If you have questions about a medical condition or this instruction, always ask your healthcare professional. [place] [address] any warranty or liability for your use of this information.

## 2019-06-17 NOTE — PROGRESS NOTES
Chronic Disease Visit Information    BP Readings from Last 3 Encounters:   06/17/19 136/82   06/12/19 134/78   09/24/18 138/72          Hemoglobin A1C (%)   Date Value   09/11/2018 8.7 (H)   01/12/2018 6.3 (H)   10/11/2017 8.1 (H)     LDL Calculated (mg/dL)   Date Value   09/11/2018 37     HDL   Date Value   09/11/2018 30.8 mg/dL (L)   05/10/2012 25 mg/dl     BUN (mg/dL)   Date Value   09/11/2018 21     CREATININE (mg/dL)   Date Value   09/11/2018 0.9     Glucose (mg/dL)   Date Value   09/11/2018 220 (H)            Have you changed or started any medications since your last visit including any over-the-counter medicines, vitamins, or herbal medicines? yes - medication list updated   Are you having any side effects from any of your medications? -  no  Have you stopped taking any of your medications? Is so, why? -  no    Have you seen any other physician or provider since your last visit? No  Have you had any other diagnostic tests since your last visit? Yes - Records Obtained  Have you been seen in the emergency room and/or had an admission to a hospital since we last saw you? No  Have you had your annual diabetic retinal (eye) exam? No  Have you activated your [employer] account? If not, what are your barriers?  Yes     Patient Care Team:  Aidan Fox DO as PCP - General (Family Medicine)  Aidan Fox DO as PCP - [facility] Provider         Medical History Review  Past Medical, Family, and Social History reviewed and does contribute to the patient presenting condition    Health Maintenance   Topic Date Due    AAA screen  1950    DTaP/Tdap/Td vaccine (1 - Tdap) 01/02/2013    Shingles Vaccine (2 of 3) 11/21/2014    Diabetic foot exam  10/19/2018    Diabetic retinal exam  07/09/2019    A1C test (Diabetic or Prediabetic)  09/11/2019    Diabetic microalbuminuria test  09/11/2019    Lipid screen  09/11/2019    Potassium monitoring  09/11/2019    Creatinine monitoring  09/11/2019    Colon cancer screen colonoscopy  01/01/2021    Flu vaccine  Completed    Pneumococcal 65+ years Vaccine  Completed    Hepatitis C screen  Addressed

## 2019-06-17 NOTE — PROGRESS NOTES
Subjective:      Patient ID: Mak Greer is a 70 y.o. male. HPI:    Chief Complaint   Patient presents with    6 Month Follow-Up     DM    Other     lung burning still     6 month eval.      Continues to have issues with \"lungs burning\". Was doing really well on the Ventolin and Tessalon perles but started to come back yesterday. A1C last week, 10.1. Pt was not surprised. Lab Results   Component Value Date    LABA1C 8.7 (H) 09/11/2018     Pt is compliant with his medications. Admittedly eating very poorly. Was a 6.3 in Jan of last year. BP well controlled. BP Readings from Last 3 Encounters:   06/17/19 136/82   06/12/19 134/78   09/24/18 138/72     Weight stable. Wt Readings from Last 3 Encounters:   06/17/19 297 lb 14.4 oz (135.1 kg)   06/12/19 298 lb 14.4 oz (135.6 kg)   09/24/18 293 lb 4.8 oz (133 kg)     Patient Active Problem List   Diagnosis    Hyperlipidemia    HTN (hypertension)    Morbid obesity ([address]    CAD (coronary artery disease)    OA (osteoarthritis)    Ulcerative colitis ([address]    Diabetes mellitus, type II ([address]    Gout    Morbid obesity with BMI of 40.0-44.9, adult ([address]    Diabetes mellitus type II, uncontrolled ([address]    Leukocytosis    Acute pyelonephritis    Prostatitis    Lower urinary tract symptoms (LUTS)    BPH (benign prostatic hyperplasia)    Diabetes ([address]    Hyperlipemia    Obstructive sleep apnea on CPAP    Uncontrolled type 2 diabetes mellitus without complication, without long-term current use of insulin ([address]     Past Surgical History:   Procedure Laterality Date    CORONARY ANGIOPLASTY      CORONARY ANGIOPLASTY WITH STENT PLACEMENT  2004    stents x 2    DIAGNOSTIC CARDIAC CATH LAB PROCEDURE      HERNIA REPAIR  1966    JOINT REPLACEMENT Right 2009    rotator cuff    NASAL SINUS SURGERY  2007     Prior to Admission medications    Medication Sig Start Date End Date Taking?  Authorizing Provider   UNABLE TO FIND    Yes Historical Provider, MD   benzonatate (TESSALON) 100 MG capsule Take 1 capsule by mouth 3 times daily as needed for Cough 6/12/19 6/17/19 Yes Loren Serrano DO   albuterol sulfate HFA (VENTOLIN HFA) 108 (90 Base) MCG/ACT inhaler Inhale 2 puffs into the lungs every 6 hours as needed for Wheezing or Shortness of Breath 6/12/19  Yes Loren Serrano DO   pravastatin (PRAVACHOL) 10 MG tablet TAKE 1 TABLET DAILY 6/3/19  Yes Loren Serrano DO   glimepiride (AMARYL) 2 MG tablet TAKE 1 TABLET TWICE A DAY 3/8/19  Yes Loren Serrano DO   JANUMET  MG per tablet TAKE 1 TABLET TWICE DAILY  WITH MEALS 3/8/19  Yes Loren Serrano DO   nitroGLYCERIN (NITROSTAT) 0.4 MG SL tablet PLACE 1 TABLET UNDER THE   TONGUE AND ALLOW TO        DISSOLVE EVERY 5 MINUTES ASNEEDED FOR CHEST PAIN 3/4/19  Yes Loren Serrano DO   celecoxib (CELEBREX) 200 MG capsule TAKE 1 CAPSULE DAILY 2/1/19  Yes Loren Serrano DO   valsartan-hydrochlorothiazide (DIOVAN-HCT) 160-25 MG per tablet TAKE 1 TABLET DAILY 9/21/18  Yes Loren Serrano DO   CO ENZYME Q-10 PO Take 1 capsule by mouth daily   Yes Historical Provider, MD   tamsulosin (FLOMAX) 0.4 MG capsule TAKE 1 CAPSULE AS NEEDED 6/5/17  Yes Loren Serrano DO   clopidogrel (PLAVIX) 75 MG tablet Take 1 tablet by mouth daily 4/25/17  Yes Loren Serrano DO   metoprolol succinate (TOPROL XL) 50 MG extended release tablet Take 1 tablet by mouth daily 4/25/17  Yes Loren Serrano DO   RELION GLUCOSE TEST STRIPS strip Check sugars 1-2 times daily. Dx: 250.02 9/26/14  Yes Loren Serrano DO   ReliOn Ultra Thin Lancets MISC Check sugars 1-2 times daily. Dx: 250.02 9/26/14  Yes Loren Serrano DO   Blood Glucose Monitoring Suppl (RELION CONFIRM GLUCOSE MONITOR) W/DEVICE KIT Relion monitor. Dx:  250.02 2/13/14  Yes Loren Serrano, DO   aspirin 81 MG chewable tablet Take 1 tablet by mouth daily.  7/2/13  Yes Loren Serrano, DO         Review of Systems   Constitutional: Negative. HENT: Negative. Respiratory: Positive for cough and chest tightness. Cardiovascular: Negative. Gastrointestinal: Negative. Musculoskeletal: Negative. All other systems reviewed and are negative. Objective:   Physical Exam   Constitutional: He is oriented to person, place, and time. He appears well-developed and well-nourished. HENT:   Head: Normocephalic and atraumatic. Right Ear: Tympanic membrane normal.   Left Ear: Tympanic membrane normal.   Mouth/Throat: Oropharynx is clear and moist and mucous membranes are normal.   Neck: Carotid bruit is not present. Cardiovascular: Normal rate, regular rhythm and normal heart sounds. No murmur heard. Pulmonary/Chest: Effort normal and breath sounds normal.   Abdominal: Soft. Bowel sounds are normal.   Musculoskeletal: He exhibits no edema. Neurological: He is alert and oriented to person, place, and time. Skin: Skin is warm and dry. Psychiatric: He has a normal mood and affect. His behavior is normal.   Nursing note and vitals reviewed. Assessment:       Diagnosis Orders   1. Tracheobronchitis  benzonatate (TESSALON) 100 MG capsule   2. Uncontrolled type 2 diabetes mellitus without complication, without long-term current use of insulin ([facility])   DIABETES FOOT EXAM    Comprehensive Metabolic Panel    Hemoglobin A1C    Microalbumin / Creatinine Urine Ratio   3. Benign non-nodular prostatic hyperplasia with lower urinary tract symptoms     4. Hyperlipidemia, unspecified hyperlipidemia type  Lipid Panel    Comprehensive Metabolic Panel    TSH with Reflex   5. Coronary artery disease involving native coronary artery of native heart without angina pectoris  CBC Auto Differential   6. Obstructive sleep apnea on CPAP     7. Ulcerative colitis without complications, unspecified location ([facility]     8. Morbid obesity with BMI of 40.0-44.9, adult ([facility]     9.  Uncontrolled type 2 diabetes mellitus without complication ([facility]     10. Screening for prostate cancer  PSA, Prostatic Specific Antigen           Plan:      -  Chronic medical problems stable with the exception of his DM  -  Labs reviewed, A1C 10.1  -  Continue current medications at his request, declines additional medication at this time  -  Follow up with specialists as scheduled  -  Recheck labs 3 mos  -  RTO 3 mos    Quality & Risk Score Accuracy    Visit Dx:  Z69.09, Z68.41 - Morbid obesity with BMI of 40.0-44.9, adult ([facility]  The current BMI is 44.64 kg/m2 as of 06/17/19 11:22 EDT  Assessment and plan:  Stable based upon symptoms and exam. Continue current treatment plan and follow up at least yearly. Visit Dx:  E11.65 - Uncontrolled type 2 diabetes mellitus without complication, without long-term current use of insulin ([facility])  Assessment and plan:  Stable based upon symptoms and exam. Continue current treatment plan and follow up at least yearly. Visit Dx:  E11.65 - Uncontrolled type 2 diabetes mellitus without complication ([facility]  Assessment and plan:  Stable based upon symptoms and exam. Continue current treatment plan and follow up at least yearly. Visit Dx:  E11.65 - Uncontrolled type 2 diabetes mellitus without complication ([facility]  Assessment and plan:  Stable based upon symptoms and exam. Continue current treatment plan and follow up at least yearly. Visit Dx:  E11.65 - Uncontrolled type 2 diabetes mellitus without complication ([facility]  Assessment and plan:  Stable based upon symptoms and exam. Continue current treatment plan and follow up at least yearly. Visit Dx:  K51.90 - Ulcerative colitis without complications, unspecified location [facility])  Assessment and plan:  Stable based upon symptoms and exam. Continue current treatment plan and follow up at least yearly.   Last edited 06/17/19 11:27 EDT by Chandni Strong, DO Chandni Strong,

## 2019-06-18 LAB — CCP IGG ANTIBODIES: 24 UNITS

## 2019-08-26 DIAGNOSIS — I25.10 CORONARY ARTERY DISEASE INVOLVING NATIVE CORONARY ARTERY OF NATIVE HEART WITHOUT ANGINA PECTORIS: ICD-10-CM

## 2019-08-26 RX ORDER — METOPROLOL SUCCINATE 50 MG/1
50 TABLET, EXTENDED RELEASE ORAL DAILY
Qty: 90 TABLET | Refills: 3 | Status: SHIPPED | OUTPATIENT
Start: 2019-08-26 | End: 2020-08-07

## 2019-08-26 RX ORDER — VALSARTAN AND HYDROCHLOROTHIAZIDE 160; 25 MG/1; MG/1
TABLET ORAL
Qty: 90 TABLET | Refills: 3 | Status: SHIPPED | OUTPATIENT
Start: 2019-08-26 | End: 2020-09-01

## 2019-09-10 ENCOUNTER — TELEPHONE (OUTPATIENT)
Dept: FAMILY MEDICINE CLINIC | Age: 69
End: 2019-09-10

## 2019-09-23 DIAGNOSIS — I25.10 CORONARY ARTERY DISEASE INVOLVING NATIVE CORONARY ARTERY OF NATIVE HEART WITHOUT ANGINA PECTORIS: ICD-10-CM

## 2019-09-23 RX ORDER — CLOPIDOGREL BISULFATE 75 MG/1
TABLET ORAL
Qty: 90 TABLET | Refills: 3 | Status: SHIPPED | OUTPATIENT
Start: 2019-09-23 | End: 2019-10-30 | Stop reason: SDUPTHER

## 2019-09-24 ENCOUNTER — TELEPHONE (OUTPATIENT)
Dept: FAMILY MEDICINE CLINIC | Age: 69
End: 2019-09-24

## 2019-09-24 NOTE — TELEPHONE ENCOUNTER
Spoke with pt regarding his upcoming appt on 10/2/19 at 9am. Confirmed appt and reminded pt to get labs done.   Pt rescheduled his appt due to being on vacation to Wednesday 10/16/19 at 9am.

## 2019-10-07 ENCOUNTER — TELEPHONE (OUTPATIENT)
Dept: FAMILY MEDICINE CLINIC | Age: 69
End: 2019-10-07

## 2019-10-10 LAB
ABSOLUTE BASO #: 0.1 X10E9/L (ref 0–0.9)
ABSOLUTE EOS #: 0.1 X10E9/L (ref 0–0.4)
ABSOLUTE LYMPH #: 2.3 X10E9/L (ref 1–3.5)
ABSOLUTE MONO #: 0.8 X10E9/L (ref 0–0.9)
ABSOLUTE NEUT #: 5.4 X10E9/L (ref 1.5–6.6)
ALBUMIN SERPL-MCNC: 4.5 G/DL (ref 3.2–5.3)
ALK PHOSPHATASE: 58 U/L (ref 39–130)
ALT SERPL-CCNC: 17 U/L (ref 0–40)
ANION GAP SERPL CALCULATED.3IONS-SCNC: 10 MMOL/L (ref 4–12)
AST SERPL-CCNC: 15 U/L (ref 0–41)
AVERAGE GLUCOSE: 171 MG/DL
BASOPHILS RELATIVE PERCENT: 0.6 %
BILIRUB SERPL-MCNC: 0.6 MG/DL (ref 0.3–1.2)
BUN BLDV-MCNC: 20 MG/DL (ref 5–27)
CALCIUM SERPL-MCNC: 9.6 MG/DL (ref 8.5–10.5)
CHLORIDE BLD-SCNC: 101 MMOL/L (ref 98–109)
CHOLESTEROL/HDL RATIO: 4.1 (ref 1–5)
CHOLESTEROL: 131 MG/DL (ref 150–200)
CO2: 30 MMOL/L (ref 22–32)
CREAT SERPL-MCNC: 0.95 MG/DL (ref 0.6–1.3)
CREATINE, URINE: 127.86 MG/DL
EGFR AFRICAN AMERICAN: >60 ML/MIN/1.73SQ.M
EGFR IF NONAFRICAN AMERICAN: >60 ML/MIN/1.73SQ.M
EOSINOPHILS RELATIVE PERCENT: 1.3 %
GLUCOSE: 171 MG/DL (ref 65–99)
HBA1C MFR BLD: 7.6 % (ref 4.4–6.4)
HCT VFR BLD CALC: 47.5 % (ref 37–51)
HDLC SERPL-MCNC: 32 MG/DL
HEMOGLOBIN: 16.8 G/DL (ref 12.6–17.4)
LDL CHOLESTEROL CALCULATED: ABNORMAL MG/DL
LDL CHOLESTEROL DIRECT: 67 MG/DL
LDL/HDL RATIO: ABNORMAL
LYMPHOCYTE %: 26.4 %
MCH RBC QN AUTO: 33 PG (ref 27–35)
MCHC RBC AUTO-ENTMCNC: 35.3 G/DL (ref 31–36)
MCV RBC AUTO: 93 FL (ref 81–101)
MICROALBUMIN/CREAT 24H UR: 1.8 MG/DL (ref 0–1.9)
MICROALBUMIN/CREAT UR-RTO: 14.1 MG/G CREAT (ref 0–30)
MONOCYTES # BLD: 8.9 %
NEUTROPHILS RELATIVE PERCENT: 62.8 %
PDW BLD-RTO: 13.5 % (ref 11.4–14.3)
PLATELETS: 253 X10E9/L (ref 150–450)
PMV BLD AUTO: 8.2 FL (ref 7–12)
POTASSIUM SERPL-SCNC: 4.2 MMOL/L (ref 3.5–5)
PSA, ULTRASENSITIVE: 2.16 NG/ML (ref 0–4)
RBC: 5.08 X10E12/L (ref 3.9–5.8)
SODIUM BLD-SCNC: 141 MMOL/L (ref 134–146)
TOTAL PROTEIN: 7.2 G/DL (ref 6–8)
TRIGL SERPL-MCNC: 401 MG/DL (ref 27–150)
TSH SERPL DL<=0.05 MIU/L-ACNC: 1.77 UIU/ML (ref 0.49–4.67)
VLDLC SERPL CALC-MCNC: 80 MG/DL (ref 0–30)
WBC: 8.6 X10E9/L (ref 4.4–12)

## 2019-10-16 ENCOUNTER — OFFICE VISIT (OUTPATIENT)
Dept: FAMILY MEDICINE CLINIC | Age: 69
End: 2019-10-16
Payer: MEDICARE

## 2019-10-16 VITALS
DIASTOLIC BLOOD PRESSURE: 74 MMHG | HEIGHT: 69 IN | HEART RATE: 68 BPM | SYSTOLIC BLOOD PRESSURE: 118 MMHG | RESPIRATION RATE: 16 BRPM | BODY MASS INDEX: 40.94 KG/M2 | WEIGHT: 276.4 LBS

## 2019-10-16 DIAGNOSIS — Z99.89 OBSTRUCTIVE SLEEP APNEA ON CPAP: ICD-10-CM

## 2019-10-16 DIAGNOSIS — G47.33 OBSTRUCTIVE SLEEP APNEA ON CPAP: ICD-10-CM

## 2019-10-16 DIAGNOSIS — E66.01 MORBID OBESITY WITH BMI OF 40.0-44.9, ADULT (HCC): ICD-10-CM

## 2019-10-16 DIAGNOSIS — K51.90 ULCERATIVE COLITIS WITHOUT COMPLICATIONS, UNSPECIFIED LOCATION (HCC): ICD-10-CM

## 2019-10-16 DIAGNOSIS — Z00.00 ROUTINE GENERAL MEDICAL EXAMINATION AT A HEALTH CARE FACILITY: Primary | ICD-10-CM

## 2019-10-16 DIAGNOSIS — I25.10 CORONARY ARTERY DISEASE INVOLVING NATIVE CORONARY ARTERY OF NATIVE HEART WITHOUT ANGINA PECTORIS: ICD-10-CM

## 2019-10-16 DIAGNOSIS — N40.1 BENIGN NON-NODULAR PROSTATIC HYPERPLASIA WITH LOWER URINARY TRACT SYMPTOMS: ICD-10-CM

## 2019-10-16 DIAGNOSIS — E78.5 HYPERLIPIDEMIA, UNSPECIFIED HYPERLIPIDEMIA TYPE: ICD-10-CM

## 2019-10-16 DIAGNOSIS — Z23 NEED FOR INFLUENZA VACCINATION: ICD-10-CM

## 2019-10-16 PROCEDURE — G8598 ASA/ANTIPLAT THER USED: HCPCS | Performed by: FAMILY MEDICINE

## 2019-10-16 PROCEDURE — 1123F ACP DISCUSS/DSCN MKR DOCD: CPT | Performed by: FAMILY MEDICINE

## 2019-10-16 PROCEDURE — 4040F PNEUMOC VAC/ADMIN/RCVD: CPT | Performed by: FAMILY MEDICINE

## 2019-10-16 PROCEDURE — G0438 PPPS, INITIAL VISIT: HCPCS | Performed by: FAMILY MEDICINE

## 2019-10-16 PROCEDURE — G0008 ADMIN INFLUENZA VIRUS VAC: HCPCS | Performed by: FAMILY MEDICINE

## 2019-10-16 PROCEDURE — 3017F COLORECTAL CA SCREEN DOC REV: CPT | Performed by: FAMILY MEDICINE

## 2019-10-16 PROCEDURE — 90653 IIV ADJUVANT VACCINE IM: CPT | Performed by: FAMILY MEDICINE

## 2019-10-16 PROCEDURE — G8482 FLU IMMUNIZE ORDER/ADMIN: HCPCS | Performed by: FAMILY MEDICINE

## 2019-10-16 ASSESSMENT — LIFESTYLE VARIABLES
HOW OFTEN DURING THE LAST YEAR HAVE YOU BEEN UNABLE TO REMEMBER WHAT HAPPENED THE NIGHT BEFORE BECAUSE YOU HAD BEEN DRINKING: 0
HOW OFTEN DO YOU HAVE SIX OR MORE DRINKS ON ONE OCCASION: 0
HAVE YOU OR SOMEONE ELSE BEEN INJURED AS A RESULT OF YOUR DRINKING: 0
AUDIT-C TOTAL SCORE: 2
HOW OFTEN DO YOU HAVE A DRINK CONTAINING ALCOHOL: 1
HOW OFTEN DURING THE LAST YEAR HAVE YOU FAILED TO DO WHAT WAS NORMALLY EXPECTED FROM YOU BECAUSE OF DRINKING: 0
HOW OFTEN DURING THE LAST YEAR HAVE YOU NEEDED AN ALCOHOLIC DRINK FIRST THING IN THE MORNING TO GET YOURSELF GOING AFTER A NIGHT OF HEAVY DRINKING: 0
HOW MANY STANDARD DRINKS CONTAINING ALCOHOL DO YOU HAVE ON A TYPICAL DAY: 1
AUDIT TOTAL SCORE: 2
HOW OFTEN DURING THE LAST YEAR HAVE YOU HAD A FEELING OF GUILT OR REMORSE AFTER DRINKING: 0
HAS A RELATIVE, FRIEND, DOCTOR, OR ANOTHER HEALTH PROFESSIONAL EXPRESSED CONCERN ABOUT YOUR DRINKING OR SUGGESTED YOU CUT DOWN: 0
HOW OFTEN DURING THE LAST YEAR HAVE YOU FOUND THAT YOU WERE NOT ABLE TO STOP DRINKING ONCE YOU HAD STARTED: 0

## 2019-10-16 ASSESSMENT — PATIENT HEALTH QUESTIONNAIRE - PHQ9
SUM OF ALL RESPONSES TO PHQ QUESTIONS 1-9: 0
SUM OF ALL RESPONSES TO PHQ QUESTIONS 1-9: 0

## 2019-10-16 ASSESSMENT — ENCOUNTER SYMPTOMS
RESPIRATORY NEGATIVE: 1
GASTROINTESTINAL NEGATIVE: 1

## 2019-10-30 ENCOUNTER — OFFICE VISIT (OUTPATIENT)
Dept: FAMILY MEDICINE CLINIC | Age: 69
End: 2019-10-30
Payer: MEDICARE

## 2019-10-30 VITALS
DIASTOLIC BLOOD PRESSURE: 68 MMHG | BODY MASS INDEX: 39.49 KG/M2 | RESPIRATION RATE: 20 BRPM | HEIGHT: 69 IN | SYSTOLIC BLOOD PRESSURE: 138 MMHG | WEIGHT: 266.6 LBS | HEART RATE: 84 BPM

## 2019-10-30 DIAGNOSIS — N30.01 HEMATURIA DUE TO ACUTE CYSTITIS: Primary | ICD-10-CM

## 2019-10-30 LAB
BILIRUBIN, POC: NEGATIVE
BLOOD URINE, POC: ABNORMAL
CLARITY, POC: ABNORMAL
COLOR, POC: ABNORMAL
GLUCOSE URINE, POC: ABNORMAL
KETONES, POC: ABNORMAL
LEUKOCYTE EST, POC: ABNORMAL
NITRITE, POC: POSITIVE
PH, POC: 5
PROTEIN, POC: NEGATIVE
SPECIFIC GRAVITY, POC: 1.02
UROBILINOGEN, POC: ABNORMAL

## 2019-10-30 PROCEDURE — 1123F ACP DISCUSS/DSCN MKR DOCD: CPT | Performed by: FAMILY MEDICINE

## 2019-10-30 PROCEDURE — G8598 ASA/ANTIPLAT THER USED: HCPCS | Performed by: FAMILY MEDICINE

## 2019-10-30 PROCEDURE — G8417 CALC BMI ABV UP PARAM F/U: HCPCS | Performed by: FAMILY MEDICINE

## 2019-10-30 PROCEDURE — G8482 FLU IMMUNIZE ORDER/ADMIN: HCPCS | Performed by: FAMILY MEDICINE

## 2019-10-30 PROCEDURE — 4040F PNEUMOC VAC/ADMIN/RCVD: CPT | Performed by: FAMILY MEDICINE

## 2019-10-30 PROCEDURE — 3017F COLORECTAL CA SCREEN DOC REV: CPT | Performed by: FAMILY MEDICINE

## 2019-10-30 PROCEDURE — 99213 OFFICE O/P EST LOW 20 MIN: CPT | Performed by: FAMILY MEDICINE

## 2019-10-30 PROCEDURE — 1036F TOBACCO NON-USER: CPT | Performed by: FAMILY MEDICINE

## 2019-10-30 PROCEDURE — G8427 DOCREV CUR MEDS BY ELIG CLIN: HCPCS | Performed by: FAMILY MEDICINE

## 2019-10-30 PROCEDURE — 81003 URINALYSIS AUTO W/O SCOPE: CPT | Performed by: FAMILY MEDICINE

## 2019-10-30 RX ORDER — CIPROFLOXACIN 250 MG/1
250 TABLET, FILM COATED ORAL 2 TIMES DAILY
Qty: 6 TABLET | Refills: 0 | Status: SHIPPED | OUTPATIENT
Start: 2019-10-30 | End: 2019-11-02

## 2019-10-30 ASSESSMENT — ENCOUNTER SYMPTOMS
ABDOMINAL PAIN: 0
GASTROINTESTINAL NEGATIVE: 1
RESPIRATORY NEGATIVE: 1

## 2019-11-01 LAB
ORGANISM: ABNORMAL
URINE CULTURE, ROUTINE: ABNORMAL

## 2019-11-21 ENCOUNTER — TELEPHONE (OUTPATIENT)
Dept: FAMILY MEDICINE CLINIC | Age: 69
End: 2019-11-21

## 2019-11-25 ENCOUNTER — TELEPHONE (OUTPATIENT)
Dept: FAMILY MEDICINE CLINIC | Age: 69
End: 2019-11-25

## 2019-11-25 RX ORDER — SULFAMETHOXAZOLE AND TRIMETHOPRIM 800; 160 MG/1; MG/1
1 TABLET ORAL 2 TIMES DAILY
Qty: 20 TABLET | Refills: 0 | Status: SHIPPED | OUTPATIENT
Start: 2019-11-25 | End: 2019-12-05 | Stop reason: SDUPTHER

## 2019-12-05 ENCOUNTER — TELEPHONE (OUTPATIENT)
Dept: FAMILY MEDICINE CLINIC | Age: 69
End: 2019-12-05

## 2019-12-05 RX ORDER — SULFAMETHOXAZOLE AND TRIMETHOPRIM 800; 160 MG/1; MG/1
1 TABLET ORAL 2 TIMES DAILY
Qty: 28 TABLET | Refills: 0 | Status: SHIPPED | OUTPATIENT
Start: 2019-12-05 | End: 2019-12-19

## 2020-02-10 RX ORDER — CELECOXIB 200 MG/1
CAPSULE ORAL
Qty: 90 CAPSULE | Refills: 3 | Status: SHIPPED | OUTPATIENT
Start: 2020-02-10 | End: 2020-12-18 | Stop reason: SDUPTHER

## 2020-04-11 LAB
AVERAGE GLUCOSE: 134 MG/DL
HBA1C MFR BLD: 6.3 % (ref 4.4–6.4)

## 2020-04-13 RX ORDER — SITAGLIPTIN AND METFORMIN HYDROCHLORIDE 1000; 50 MG/1; MG/1
TABLET, FILM COATED ORAL
Qty: 180 TABLET | Refills: 3 | Status: SHIPPED | OUTPATIENT
Start: 2020-04-13 | End: 2020-12-21 | Stop reason: SDUPTHER

## 2020-05-08 RX ORDER — PRAVASTATIN SODIUM 10 MG
TABLET ORAL
Qty: 90 TABLET | Refills: 3 | Status: SHIPPED | OUTPATIENT
Start: 2020-05-08 | End: 2020-12-02 | Stop reason: SDUPTHER

## 2020-05-08 RX ORDER — GLIMEPIRIDE 2 MG/1
TABLET ORAL
Qty: 180 TABLET | Refills: 3 | Status: SHIPPED | OUTPATIENT
Start: 2020-05-08 | End: 2020-12-21 | Stop reason: SDUPTHER

## 2020-06-29 RX ORDER — TAMSULOSIN HYDROCHLORIDE 0.4 MG/1
CAPSULE ORAL
Qty: 90 CAPSULE | Refills: 3 | Status: SHIPPED | OUTPATIENT
Start: 2020-06-29 | End: 2020-12-02 | Stop reason: SDUPTHER

## 2020-08-07 RX ORDER — METOPROLOL SUCCINATE 50 MG/1
TABLET, EXTENDED RELEASE ORAL
Qty: 90 TABLET | Refills: 3 | Status: SHIPPED | OUTPATIENT
Start: 2020-08-07 | End: 2020-12-02 | Stop reason: SDUPTHER

## 2020-09-01 RX ORDER — VALSARTAN AND HYDROCHLOROTHIAZIDE 160; 25 MG/1; MG/1
TABLET ORAL
Qty: 90 TABLET | Refills: 3 | Status: SHIPPED | OUTPATIENT
Start: 2020-09-01 | End: 2020-12-18 | Stop reason: SDUPTHER

## 2020-09-01 RX ORDER — CLOPIDOGREL BISULFATE 75 MG/1
TABLET ORAL
Qty: 90 TABLET | Refills: 3 | Status: SHIPPED | OUTPATIENT
Start: 2020-09-01 | End: 2020-12-02 | Stop reason: SDUPTHER

## 2020-12-02 RX ORDER — NITROGLYCERIN 0.4 MG/1
TABLET SUBLINGUAL
Qty: 25 TABLET | Refills: 0 | Status: SHIPPED | OUTPATIENT
Start: 2020-12-02 | End: 2020-12-18 | Stop reason: SDUPTHER

## 2020-12-02 RX ORDER — PRAVASTATIN SODIUM 10 MG
TABLET ORAL
Qty: 90 TABLET | Refills: 3 | Status: SHIPPED | OUTPATIENT
Start: 2020-12-02 | End: 2020-12-18 | Stop reason: SDUPTHER

## 2020-12-02 RX ORDER — METOPROLOL SUCCINATE 50 MG/1
TABLET, EXTENDED RELEASE ORAL
Qty: 90 TABLET | Refills: 3 | Status: SHIPPED | OUTPATIENT
Start: 2020-12-02 | End: 2020-12-18 | Stop reason: SDUPTHER

## 2020-12-02 RX ORDER — TAMSULOSIN HYDROCHLORIDE 0.4 MG/1
CAPSULE ORAL
Qty: 90 CAPSULE | Refills: 3 | Status: SHIPPED | OUTPATIENT
Start: 2020-12-02 | End: 2020-12-18 | Stop reason: SDUPTHER

## 2020-12-02 RX ORDER — CLOPIDOGREL BISULFATE 75 MG/1
TABLET ORAL
Qty: 90 TABLET | Refills: 3 | Status: SHIPPED | OUTPATIENT
Start: 2020-12-02 | End: 2020-12-18 | Stop reason: SDUPTHER

## 2020-12-02 NOTE — TELEPHONE ENCOUNTER
Patient requesting to have all medication go to [facility] [address], [address] [facility] [phone]  **unable to find Pharmacy in Pharmacy search*    Patient also requesting a refill on    JANUMET 50-1000MG PER TABLET  clopidogrel (PLAVIX) 75 MG tablet   RELION GLUCOSE TEST STRIPS strip   ReliOn Ultra Thin Lancets [facility] [address] [place], [address] [facility] [phone]    Aminah Epperson called requesting a refill on the following medications:  Requested Prescriptions     Pending Prescriptions Disp Refills    metoprolol succinate (TOPROL XL) 50 MG extended release tablet 90 tablet 3    tamsulosin (FLOMAX) 0.4 MG capsule 90 capsule 3    pravastatin (PRAVACHOL) 10 MG tablet 90 tablet 3    clopidogrel (PLAVIX) 75 MG tablet 90 tablet 3    nitroGLYCERIN (NITROSTAT) 0.4 MG SL tablet 25 tablet 0     Sig: up to max of 3 total doses. If no relief after 1 dose, call 911.      Pharmacy verified:  .pv      Date of last visit: 10/30/20  Date of next visit (if applicable): Visit date not found

## 2020-12-18 ENCOUNTER — TELEPHONE (OUTPATIENT)
Dept: FAMILY MEDICINE CLINIC | Age: 70
End: 2020-12-18

## 2020-12-18 RX ORDER — VALSARTAN AND HYDROCHLOROTHIAZIDE 160; 25 MG/1; MG/1
TABLET ORAL
Qty: 90 TABLET | Refills: 3 | Status: SHIPPED | OUTPATIENT
Start: 2020-12-18

## 2020-12-18 RX ORDER — CLOPIDOGREL BISULFATE 75 MG/1
TABLET ORAL
Qty: 90 TABLET | Refills: 3 | Status: SHIPPED | OUTPATIENT
Start: 2020-12-18

## 2020-12-18 RX ORDER — NITROGLYCERIN 0.4 MG/1
TABLET SUBLINGUAL
Qty: 25 TABLET | Refills: 0 | Status: SHIPPED | OUTPATIENT
Start: 2020-12-18 | End: 2021-07-28

## 2020-12-18 RX ORDER — PRAVASTATIN SODIUM 10 MG
TABLET ORAL
Qty: 90 TABLET | Refills: 3 | Status: SHIPPED | OUTPATIENT
Start: 2020-12-18

## 2020-12-18 RX ORDER — METOPROLOL SUCCINATE 50 MG/1
TABLET, EXTENDED RELEASE ORAL
Qty: 90 TABLET | Refills: 3 | Status: SHIPPED | OUTPATIENT
Start: 2020-12-18

## 2020-12-18 RX ORDER — TAMSULOSIN HYDROCHLORIDE 0.4 MG/1
CAPSULE ORAL
Qty: 90 CAPSULE | Refills: 3 | Status: SHIPPED | OUTPATIENT
Start: 2020-12-18

## 2020-12-18 RX ORDER — CELECOXIB 200 MG/1
CAPSULE ORAL
Qty: 90 CAPSULE | Refills: 3 | Status: SHIPPED | OUTPATIENT
Start: 2020-12-18

## 2020-12-18 NOTE — TELEPHONE ENCOUNTER
[facility] received a call from:    Name of Caller: Luisa    Relationship to patient:Pharmacy    Organization name: [employer] contact number: [phone]    Reason for call: Pharmacy called in and stated that pt contacted him and said that he needs all of his meds sent over to the pharm so they can take of the refills, pharmacy was not sure which ones that they need to take care of please follow up

## 2020-12-18 NOTE — TELEPHONE ENCOUNTER
I spoke with patient and he needs refills on the following pended medications:    Metoprolol, flomax, pravastatin, plavix, nitro, Diovan HCT, janumet, glimepiride, celebrex,     Glimperide and Janumet need DX codes added to them to refill  Patient will check with pharmacy after 1pm regarding refills.       He was dropped by [facility] when he moved per patient see RXs sent on 12/2/20 were cancelled

## 2020-12-21 RX ORDER — SITAGLIPTIN AND METFORMIN HYDROCHLORIDE 1000; 50 MG/1; MG/1
TABLET, FILM COATED ORAL
Qty: 180 TABLET | Refills: 3 | Status: SHIPPED | OUTPATIENT
Start: 2020-12-21

## 2020-12-21 RX ORDER — GLIMEPIRIDE 2 MG/1
TABLET ORAL
Qty: 180 TABLET | Refills: 3 | Status: SHIPPED | OUTPATIENT
Start: 2020-12-21

## 2021-07-28 DIAGNOSIS — I25.10 CORONARY ARTERY DISEASE INVOLVING NATIVE CORONARY ARTERY OF NATIVE HEART WITHOUT ANGINA PECTORIS: ICD-10-CM

## 2021-07-28 RX ORDER — NITROGLYCERIN 0.4 MG/1
TABLET SUBLINGUAL
Qty: 25 TABLET | Refills: 0 | Status: SHIPPED | OUTPATIENT
Start: 2021-07-28